# Patient Record
Sex: MALE | Race: WHITE | NOT HISPANIC OR LATINO | Employment: FULL TIME | URBAN - METROPOLITAN AREA
[De-identification: names, ages, dates, MRNs, and addresses within clinical notes are randomized per-mention and may not be internally consistent; named-entity substitution may affect disease eponyms.]

---

## 2017-11-13 ENCOUNTER — ALLIED HEALTH/NURSE VISIT (OUTPATIENT)
Dept: FAMILY MEDICINE | Facility: OTHER | Age: 32
End: 2017-11-13
Attending: FAMILY MEDICINE
Payer: COMMERCIAL

## 2017-11-13 DIAGNOSIS — Z23 NEED FOR PROPHYLACTIC VACCINATION AND INOCULATION AGAINST INFLUENZA: Primary | ICD-10-CM

## 2017-11-13 PROCEDURE — 90686 IIV4 VACC NO PRSV 0.5 ML IM: CPT

## 2017-11-13 PROCEDURE — 90471 IMMUNIZATION ADMIN: CPT

## 2017-11-13 NOTE — PROGRESS NOTES

## 2017-11-13 NOTE — MR AVS SNAPSHOT
After Visit Summary   11/13/2017    Kaleb Junior    MRN: [id number]           Patient Information     Date Of Birth          1985        Visit Information        Provider Department      11/13/2017 4:30 PM [facility] NURSE [facility]        Today's Diagnoses     Need for prophylactic vaccination and inoculation against influenza    -  1       Follow-ups after your visit        Your next 10 appointments already scheduled     Nov 13, 2017  4:30 PM CST   (Arrive by 4:15 PM)   Nurse Only with [facility] NURSE   [facility] ([facility] )    [address]  [facility] [address]   [phone]              Who to contact     If you have questions or need follow up information about today's clinic visit or your schedule please contact [facility] directly at [phone].  Normal or non-critical lab and imaging results will be communicated to you by Mobibeamhart, letter or phone within 4 business days after the clinic has received the results. If you do not hear from us within 7 days, please contact the clinic through Mobibeamhart or phone. If you have a critical or abnormal lab result, we will notify you by phone as soon as possible.  Submit refill requests through AbleSky or call your pharmacy and they will forward the refill request to us. Please allow 3 business days for your refill to be completed.          Additional Information About Your Visit        Mobibeamhart Information     AbleSky gives you secure access to your electronic health record. If you see a primary care provider, you can also send messages to your care team and make appointments. If you have questions, please call your primary care clinic.  If you do not have a primary care provider, please call [phone] and they will assist you.        Care EveryWhere ID     This is your Care EveryWhere ID. This could be used by other organizations to access your [facility]  records  [id number]         Blood Pressure from Last 3 Encounters:   04/22/14 126/85    Weight from Last 3 Encounters:   04/22/14 180 lb (81.6 kg)              We Performed the Following     FLU VAC, SPLIT VIRUS IM > 3 YO (QUADRIVALENT) [10539]     Vaccine Administration, Initial [84340]        Primary Care Provider    Physician No Ref-Primary       NO REF-PRIMARY PHYSICIAN        Equal Access to Services     [facility] : Hadii aad ku hadasho Soomaali, waaxda luqadaha, qaybta kaalmada adeegyada, waxay olivain hayaan adeeg tankmyrandajohn lasergey . So [facility] [phone].    ATENCIÓN: Si habla español, tiene a rock disposición servicios gratuitos de asistencia lingüística. Llame al [phone].    We comply with applicable federal civil rights laws and [place] laws. We do not discriminate on the basis of race, color, national origin, age, disability, sex, sexual orientation, or gender identity.            Thank you!     Thank you for choosing [facility]  for your care. Our goal is always to provide you with excellent care. Hearing back from our patients is one way we can continue to improve our services. Please take a few minutes to complete the written survey that you may receive in the mail after your visit with us. Thank you!             Your Updated Medication List - Protect others around you: Learn how to safely use, store and throw away your medicines at www.disposemymeds.org.      Notice  As of 11/13/2017  3:59 PM    You have not been prescribed any medications.

## 2018-03-08 ENCOUNTER — OFFICE VISIT (OUTPATIENT)
Dept: FAMILY MEDICINE | Facility: OTHER | Age: 33
End: 2018-03-08
Attending: FAMILY MEDICINE
Payer: COMMERCIAL

## 2018-03-08 VITALS
WEIGHT: 203 LBS | RESPIRATION RATE: 16 BRPM | TEMPERATURE: 98.2 F | SYSTOLIC BLOOD PRESSURE: 112 MMHG | BODY MASS INDEX: 26.9 KG/M2 | DIASTOLIC BLOOD PRESSURE: 70 MMHG | HEART RATE: 78 BPM | OXYGEN SATURATION: 98 % | HEIGHT: 73 IN

## 2018-03-08 DIAGNOSIS — Z23 NEED FOR VACCINATION: ICD-10-CM

## 2018-03-08 DIAGNOSIS — Z76.89 ENCOUNTER TO ESTABLISH CARE: Primary | ICD-10-CM

## 2018-03-08 PROCEDURE — 90715 TDAP VACCINE 7 YRS/> IM: CPT | Performed by: FAMILY MEDICINE

## 2018-03-08 PROCEDURE — 90471 IMMUNIZATION ADMIN: CPT | Performed by: FAMILY MEDICINE

## 2018-03-08 PROCEDURE — 99201 ZZC OFFICE/OUTPT VISIT, NEW, LEVEL I: CPT | Mod: 25 | Performed by: FAMILY MEDICINE

## 2018-03-08 ASSESSMENT — ANXIETY QUESTIONNAIRES
6. BECOMING EASILY ANNOYED OR IRRITABLE: NOT AT ALL
4. TROUBLE RELAXING: NOT AT ALL
1. FEELING NERVOUS, ANXIOUS, OR ON EDGE: NOT AT ALL
GAD7 TOTAL SCORE: 0
2. NOT BEING ABLE TO STOP OR CONTROL WORRYING: NOT AT ALL
3. WORRYING TOO MUCH ABOUT DIFFERENT THINGS: NOT AT ALL
5. BEING SO RESTLESS THAT IT IS HARD TO SIT STILL: NOT AT ALL
7. FEELING AFRAID AS IF SOMETHING AWFUL MIGHT HAPPEN: NOT AT ALL
IF YOU CHECKED OFF ANY PROBLEMS ON THIS QUESTIONNAIRE, HOW DIFFICULT HAVE THESE PROBLEMS MADE IT FOR YOU TO DO YOUR WORK, TAKE CARE OF THINGS AT HOME, OR GET ALONG WITH OTHER PEOPLE: NOT DIFFICULT AT ALL

## 2018-03-08 ASSESSMENT — PAIN SCALES - GENERAL: PAINLEVEL: NO PAIN (0)

## 2018-03-08 NOTE — MR AVS SNAPSHOT
"              After Visit Summary   3/8/2018    Kaleb Junior    MRN: [id number]           Patient Information     Date Of Birth          1985        Visit Information        Provider Department      3/8/2018 1:30 PM Tomasa Colin MD [facility]        Today's Diagnoses     Encounter to establish care    -  1    Need for vaccination           Follow-ups after your visit        Follow-up notes from your care team     Return if symptoms worsen or fail to improve.      Who to contact     If you have questions or need follow up information about today's clinic visit or your schedule please contact [facility] directly at [phone].  Normal or non-critical lab and imaging results will be communicated to you by MyChart, letter or phone within 4 business days after the clinic has received the results. If you do not hear from us within 7 days, please contact the clinic through Samfindhart or phone. If you have a critical or abnormal lab result, we will notify you by phone as soon as possible.  Submit refill requests through [employer] or call your pharmacy and they will forward the refill request to us. Please allow 3 business days for your refill to be completed.          Additional Information About Your Visit        MyChart Information     [employer] gives you secure access to your electronic health record. If you see a primary care provider, you can also send messages to your care team and make appointments. If you have questions, please call your primary care clinic.  If you do not have a primary care provider, please call [phone] and they will assist you.        Care EveryWhere ID     This is your Care EveryWhere ID. This could be used by other organizations to access your [place] medical records  [id number]        Your Vitals Were     Pulse Temperature Respirations Height Pulse Oximetry BMI (Body Mass Index)    78 98.2  F (36.8  C) (Tympanic) 16 6' 1\" (1.854 m) 98% 26.78 kg/m2 "       Blood Pressure from Last 3 Encounters:   03/08/18 112/70   04/22/14 126/85    Weight from Last 3 Encounters:   03/08/18 203 lb (92.1 kg)   04/22/14 180 lb (81.6 kg)              We Performed the Following     1st  Administration  [25056]     TDAP VACCINE (ADACEL) [83821.002]        Primary Care Provider Fax #    Physician No Ref-Primary [phone]       No address on file        Equal Access to Services     [facility] : Hadii aad ku hadasho Soomaali, waaxda luqadaha, qaybta kaalmada adeegyada, waxay idiin hayarmandn adecandi fumyrandajohn mcbride . So [facility] [phone].    ATENCIÓN: Si habla español, tiene a rock disposición servicios gratuitos de asistencia lingüística. Llame al [phone].    We comply with applicable federal civil rights laws and [place] laws. We do not discriminate on the basis of race, color, national origin, age, disability, sex, sexual orientation, or gender identity.            Thank you!     Thank you for choosing [facility]  for your care. Our goal is always to provide you with excellent care. Hearing back from our patients is one way we can continue to improve our services. Please take a few minutes to complete the written survey that you may receive in the mail after your visit with us. Thank you!             Your Updated Medication List - Protect others around you: Learn how to safely use, store and throw away your medicines at www.disposemymeds.org.      Notice  As of 3/8/2018 11:59 PM    You have not been prescribed any medications.

## 2018-03-08 NOTE — NURSING NOTE
"Chief Complaint   Patient presents with     Establish Care       Initial /70 (BP Location: Left arm, Patient Position: Chair, Cuff Size: Adult Large)  Pulse 78  Temp 98.2  F (36.8  C) (Tympanic)  Resp 16  Ht 6' 1\" (1.854 m)  Wt 203 lb (92.1 kg)  SpO2 98%  BMI 26.78 kg/m2 Estimated body mass index is 26.78 kg/(m^2) as calculated from the following:    Height as of this encounter: 6' 1\" (1.854 m).    Weight as of this encounter: 203 lb (92.1 kg).  Medication Reconciliation: complete   Pamela M Lechevalier LPN      "

## 2018-03-09 ASSESSMENT — PATIENT HEALTH QUESTIONNAIRE - PHQ9: SUM OF ALL RESPONSES TO PHQ QUESTIONS 1-9: 0

## 2018-03-09 ASSESSMENT — ANXIETY QUESTIONNAIRES: GAD7 TOTAL SCORE: 0

## 2018-03-09 NOTE — PROGRESS NOTES
"  SUBJECTIVE:   Kaleb Junior is a 32 year old male who presents to clinic today for the following health issues:    New Patient/Establish Care    Patient presents today to establish care.  He states he is overall pretty healthy and has no specific concerns or complaints he wishes to discuss today.  He does complete a [employer] exam at work annually, and does have labs completed at that time.  He states he will be having another exam in the fall, and will bring me a copy of the lab results.  In review of his chart, he is due to Tdap.      Problem list and histories reviewed & adjusted, as indicated.  Additional history: as documented    Patient Active Problem List   Diagnosis     NO ACTIVE PROBLEMS     Past Surgical History:   Procedure Laterality Date     HAND SURGERY Right 2006     ORTHOPEDIC SURGERY      tendon repair right hand       Social History   Substance Use Topics     Smoking status: Former Smoker     Smokeless tobacco: Current User     Types: Chew     Alcohol use Yes      Comment: occasionaly      Family History   Problem Relation Age of Onset     DIABETES Paternal Grandmother          No current outpatient prescriptions on file.     No Known Allergies    Reviewed and updated as needed this visit by clinical staff  Tobacco  Allergies  Meds  Problems  Soc Hx      Reviewed and updated as needed this visit by Provider         ROS:  Constitutional, HEENT, cardiovascular, pulmonary, gi and gu systems are negative, except as otherwise noted.    OBJECTIVE:     /70 (BP Location: Left arm, Patient Position: Chair, Cuff Size: Adult Large)  Pulse 78  Temp 98.2  F (36.8  C) (Tympanic)  Resp 16  Ht 6' 1\" (1.854 m)  Wt 203 lb (92.1 kg)  SpO2 98%  BMI 26.78 kg/m2  Body mass index is 26.78 kg/(m^2).  GENERAL: healthy, alert and no distress  RESP: lungs clear to auscultation - no rales, rhonchi or wheezes  CV: regular rate and rhythm, normal S1 S2, no S3 or S4, no murmur, click or rub, no " peripheral edema and peripheral pulses strong  PSYCH: mentation appears normal, affect normal/bright    Diagnostic Test Results:  none     ASSESSMENT/PLAN:       ICD-10-CM    1. Encounter to establish care Z76.89    2. Need for vaccination Z23 TDAP VACCINE (ADACEL) [89075.002]     1st  Administration  [33016]       FUTURE APPOINTMENTS:       - Follow-up for annual visit or as needed    Tomasa Colin MD  [facility]

## 2018-10-29 ENCOUNTER — ALLIED HEALTH/NURSE VISIT (OUTPATIENT)
Dept: FAMILY MEDICINE | Facility: OTHER | Age: 33
End: 2018-10-29
Attending: FAMILY MEDICINE
Payer: COMMERCIAL

## 2018-10-29 DIAGNOSIS — Z23 NEED FOR PROPHYLACTIC VACCINATION AND INOCULATION AGAINST INFLUENZA: Primary | ICD-10-CM

## 2018-10-29 PROCEDURE — 90686 IIV4 VACC NO PRSV 0.5 ML IM: CPT

## 2018-10-29 PROCEDURE — 90471 IMMUNIZATION ADMIN: CPT

## 2018-10-29 NOTE — MR AVS SNAPSHOT
After Visit Summary   10/29/2018    Kaleb Junior    MRN: [id number]           Patient Information     Date Of Birth          1985        Visit Information        Provider Department      10/29/2018 1:45 PM [facility] NURSE [facility]        Today's Diagnoses     Need for prophylactic vaccination and inoculation against influenza    -  1       Follow-ups after your visit        Your next 10 appointments already scheduled     Oct 29, 2018  1:45 PM CDT   (Arrive by 1:30 PM)   Nurse Only with [facility] NURSE   [facility] ([facility] )    [address]  [facility] [address]   [phone]              Who to contact     If you have questions or need follow up information about today's clinic visit or your schedule please contact [facility] directly at [phone].  Normal or non-critical lab and imaging results will be communicated to you by MyChart, letter or phone within 4 business days after the clinic has received the results. If you do not hear from us within 7 days, please contact the clinic through makerSQRhart or phone. If you have a critical or abnormal lab result, we will notify you by phone as soon as possible.  Submit refill requests through Glimpse or call your pharmacy and they will forward the refill request to us. Please allow 3 business days for your refill to be completed.          Additional Information About Your Visit        MyChart Information     Glimpse gives you secure access to your electronic health record. If you see a primary care provider, you can also send messages to your care team and make appointments. If you have questions, please call your primary care clinic.  If you do not have a primary care provider, please call [phone] and they will assist you.        Care EveryWhere ID     This is your Care EveryWhere ID. This could be used by other organizations to access your  [place] medical records  [id number]         Blood Pressure from Last 3 Encounters:   03/08/18 112/70   04/22/14 126/85    Weight from Last 3 Encounters:   03/08/18 203 lb (92.1 kg)   04/22/14 180 lb (81.6 kg)              We Performed the Following     HC FLU VAC PRESRV FREE QUAD SPLIT VIR 3+YRS IM     Vaccine Administration, Initial [85627]        Primary Care Provider Office Phone # Fax #    Tomasa ARTUR Colin [phone] [address] [facility] [address]        Equal Access to Services     [facility]: Hadii roderick moreno Sooumar, waaxda luqadaha, qaybta kaalmaverónica gregorio, valentin mcbride . So [facility] [phone].    ATENCIÓN: Si habla español, tiene a rock disposición servicios gratuitos de asistencia lingüística. LlThe [facility] [phone].    We comply with applicable federal civil rights laws and [place] laws. We do not discriminate on the basis of race, color, national origin, age, disability, sex, sexual orientation, or gender identity.            Thank you!     Thank you for choosing [facility]  for your care. Our goal is always to provide you with excellent care. Hearing back from our patients is one way we can continue to improve our services. Please take a few minutes to complete the written survey that you may receive in the mail after your visit with us. Thank you!             Your Updated Medication List - Protect others around you: Learn how to safely use, store and throw away your medicines at www.disposemymeds.org.      Notice  As of 10/29/2018 11:17 AM    You have not been prescribed any medications.

## 2018-10-29 NOTE — PROGRESS NOTES

## 2018-11-05 ENCOUNTER — TRANSFERRED RECORDS (OUTPATIENT)
Dept: HEALTH INFORMATION MANAGEMENT | Facility: CLINIC | Age: 33
End: 2018-11-05

## 2018-11-05 LAB
ALT SERPL-CCNC: 21 U/L (ref 9–46)
AST SERPL-CCNC: 16 U/L (ref 10–40)
CHOLEST SERPL-MCNC: 200 MG/DL
CREAT SERPL-MCNC: 0.91 MG/DL (ref 0.6–1.35)
GFR SERPL CREATININE-BSD FRML MDRD: 110 ML/MIN/1.73M2
GLUCOSE SERPL-MCNC: 90 MG/DL (ref 65–99)
HDLC SERPL-MCNC: 61 MG/DL
LDLC SERPL CALC-MCNC: 117 MG/DL
NONHDLC SERPL-MCNC: 139 MG/DL
POTASSIUM SERPL-SCNC: 4.5 MMOL/L (ref 3.5–5.3)
TRIGL SERPL-MCNC: 108 MG/DL

## 2019-11-25 ENCOUNTER — ALLIED HEALTH/NURSE VISIT (OUTPATIENT)
Dept: FAMILY MEDICINE | Facility: OTHER | Age: 34
End: 2019-11-25
Attending: FAMILY MEDICINE
Payer: COMMERCIAL

## 2019-11-25 DIAGNOSIS — Z23 NEED FOR PROPHYLACTIC VACCINATION AND INOCULATION AGAINST INFLUENZA: Primary | ICD-10-CM

## 2019-11-25 PROCEDURE — 90471 IMMUNIZATION ADMIN: CPT

## 2019-11-25 PROCEDURE — 90686 IIV4 VACC NO PRSV 0.5 ML IM: CPT

## 2020-03-02 ENCOUNTER — HEALTH MAINTENANCE LETTER (OUTPATIENT)
Age: 35
End: 2020-03-02

## 2020-07-14 ENCOUNTER — VIRTUAL VISIT (OUTPATIENT)
Dept: FAMILY MEDICINE | Facility: OTHER | Age: 35
End: 2020-07-14

## 2020-07-14 ENCOUNTER — OFFICE VISIT (OUTPATIENT)
Dept: FAMILY MEDICINE | Facility: OTHER | Age: 35
End: 2020-07-14
Attending: FAMILY MEDICINE
Payer: COMMERCIAL

## 2020-07-14 VITALS
HEART RATE: 97 BPM | OXYGEN SATURATION: 98 % | WEIGHT: 214 LBS | BODY MASS INDEX: 28.36 KG/M2 | SYSTOLIC BLOOD PRESSURE: 126 MMHG | HEIGHT: 73 IN | RESPIRATION RATE: 16 BRPM | DIASTOLIC BLOOD PRESSURE: 76 MMHG | TEMPERATURE: 97.8 F

## 2020-07-14 DIAGNOSIS — R50.9 FEVER, UNSPECIFIED FEVER CAUSE: ICD-10-CM

## 2020-07-14 DIAGNOSIS — R05.9 COUGH: Primary | ICD-10-CM

## 2020-07-14 PROCEDURE — U0003 INFECTIOUS AGENT DETECTION BY NUCLEIC ACID (DNA OR RNA); SEVERE ACUTE RESPIRATORY SYNDROME CORONAVIRUS 2 (SARS-COV-2) (CORONAVIRUS DISEASE [COVID-19]), AMPLIFIED PROBE TECHNIQUE, MAKING USE OF HIGH THROUGHPUT TECHNOLOGIES AS DESCRIBED BY CMS-2020-01-R: HCPCS | Performed by: FAMILY MEDICINE

## 2020-07-14 PROCEDURE — 99213 OFFICE O/P EST LOW 20 MIN: CPT | Performed by: FAMILY MEDICINE

## 2020-07-14 ASSESSMENT — ANXIETY QUESTIONNAIRES
4. TROUBLE RELAXING: NOT AT ALL
3. WORRYING TOO MUCH ABOUT DIFFERENT THINGS: NOT AT ALL
IF YOU CHECKED OFF ANY PROBLEMS ON THIS QUESTIONNAIRE, HOW DIFFICULT HAVE THESE PROBLEMS MADE IT FOR YOU TO DO YOUR WORK, TAKE CARE OF THINGS AT HOME, OR GET ALONG WITH OTHER PEOPLE: NOT DIFFICULT AT ALL
1. FEELING NERVOUS, ANXIOUS, OR ON EDGE: NOT AT ALL
6. BECOMING EASILY ANNOYED OR IRRITABLE: NOT AT ALL
2. NOT BEING ABLE TO STOP OR CONTROL WORRYING: NOT AT ALL
GAD7 TOTAL SCORE: 0
7. FEELING AFRAID AS IF SOMETHING AWFUL MIGHT HAPPEN: NOT AT ALL
5. BEING SO RESTLESS THAT IT IS HARD TO SIT STILL: NOT AT ALL

## 2020-07-14 ASSESSMENT — MIFFLIN-ST. JEOR: SCORE: 1954.58

## 2020-07-14 ASSESSMENT — PATIENT HEALTH QUESTIONNAIRE - PHQ9: SUM OF ALL RESPONSES TO PHQ QUESTIONS 1-9: 0

## 2020-07-14 NOTE — PROGRESS NOTES
"Subjective     Kaleb Junior is a 35 year old male who presents to clinic today for the following health issues:    HPI   RESPIRATORY SYMPTOMS      Duration: 5 days    Description  nasal congestion, sore throat, cough, fever, headache, fatigue/malaise and hoarse voice    Severity: mild    Accompanying signs and symptoms: None    History (predisposing factors):  none    Precipitating or alleviating factors: None    Therapies tried and outcome:  rest and fluids acetaminophen    Patient did start an encounter with [facility], testing was recommended.    Patient Active Problem List   Diagnosis     NO ACTIVE PROBLEMS     Past Surgical History:   Procedure Laterality Date     HAND SURGERY Right 2006     ORTHOPEDIC SURGERY      tendon repair right hand       Social History     Tobacco Use     Smoking status: Former Smoker     Smokeless tobacco: Current User     Types: Chew   Substance Use Topics     Alcohol use: Yes     Comment: occasionaly      Family History   Problem Relation Age of Onset     Diabetes Paternal Grandmother          No current outpatient medications on file.     No Known Allergies      Reviewed and updated as needed this visit by Provider  Tobacco  Allergies  Meds  Problems  Med Hx  Surg Hx  Fam Hx         Review of Systems   Constitutional, HEENT, cardiovascular, pulmonary, gi and gu systems are negative, except as otherwise noted.      Objective    /76 (BP Location: Right arm, Patient Position: Sitting, Cuff Size: Adult Regular)   Pulse 97   Temp 97.8  F (36.6  C) (Tympanic)   Resp 16   Ht 1.854 m (6' 1\")   Wt 97.1 kg (214 lb)   SpO2 98%   BMI 28.23 kg/m    Body mass index is 28.23 kg/m .  Physical Exam   GENERAL: healthy, alert and no distress  EYES: Eyes grossly normal to inspection, PERRL and conjunctivae and sclerae normal  HENT: ear canals and TM's normal, nose and mouth without ulcers or lesions  NECK: no adenopathy  RESP: lungs clear to auscultation - no rales, rhonchi or " "wheezes  CV: regular rates and rhythm, normal S1 S2, no S3 or S4 and no murmur, click or rub  PSYCH: mentation appears normal, affect normal/bright    Diagnostic Test Results:  See orders        Assessment & Plan     1. Cough  Testing ordered, symptoms are likely allergy vs viral vs other.  Will notify patient of results when available.  - Symptomatic COVID-19 Virus (Coronavirus) by PCR    2. Fever, unspecified fever cause  - Symptomatic COVID-19 Virus (Coronavirus) by PCR     BMI:   Estimated body mass index is 28.23 kg/m  as calculated from the following:    Height as of this encounter: 1.854 m (6' 1\").    Weight as of this encounter: 97.1 kg (214 lb).         Return if symptoms worsen or fail to improve.    Tomasa Colin MD  [facility]      "

## 2020-07-14 NOTE — PROGRESS NOTES
"Date: 2020 06:56:10  Clinician: Tavo Rosenberg  Clinician NPI: [id number]  Patient: Kaleb Junior  Patient : 1985  Patient Address: [place] [address]  Patient Phone: [phone]  Visit Protocol: URI  Patient Summary:  Kaleb is a 35 year old ( : 1985 ) male who initiated a Visit for cold, sinus infection, or influenza. When asked the question \"Please sign me up to receive news, health information and promotions from [employer].\", Kaleb responded \"No\".    Kaleb states his symptoms started gradually 3-4 days ago.   His symptoms consist of rhinitis, malaise, a headache, a cough, and nasal congestion. Kaleb also feels feverish.   Symptom details     Nasal secretions: The color of his mucus is white, yellow, and clear.    Cough: Kaleb coughs a few times an hour and his cough is not more bothersome at night. Phlegm comes into his throat when he coughs. He believes his cough is caused by post-nasal drip. The color of the phlegm is yellow.     Temperature: His current temperature is 99.5 degrees Fahrenheit.     Headache: He states the headache is mild (1-3 on a 10 point pain scale).      Kaleb denies having wheezing, nausea, teeth pain, ageusia, diarrhea, vomiting, ear pain, chills, sore throat, myalgias, anosmia, and facial pain or pressure. He also denies having recent facial or sinus surgery in the past 60 days, taking antibiotic medication in the past month, having a sinus infection within the past year, and double sickening (worsening symptoms after initial improvement). He is not experiencing dyspnea.   Precipitating events  He has not recently been exposed to someone with influenza. Kaleb has been in close contact with the following high risk individuals: children under the age of 5, immunocompromised people, and adults 65 or older.   Pertinent COVID-19 (Coronavirus) information  In the past 14 days, Kaleb has not worked in a congregate living setting.   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 He does not provide direct patient care. Additional job details as reported by the patient (free text):  working for the ambulance service as a . Also a  for [employer]   Kaleb also has not lived in a congregate living setting in the past 14 days. He does not live with a healthcare worker.   Kaleb has not had a close contact with a laboratory-confirmed COVID-19 patient within 14 days of symptom onset.   Pertinent medical history  Kaleb does not need a return to work/school note.   Weight: 220 lbs   Kaleb does not smoke or use smokeless tobacco.   Weight: 220 lbs    MEDICATIONS: No current medications, ALLERGIES: NKDA  Clinician Response:  Dear Kaleb,    Alvarado reese, Kaleb,  You do need to be tested and need to self-quarantine until results are back. Please see the contact information below to set up a testing appointment. WILMA Rosenberg MD     Your symptoms show that you may have coronavirus (COVID-19). This illness can cause fever, cough and trouble breathing. Many people get a mild case and get better on their own. Some people can get very sick.  What should I do?  We would like to test you for this virus.   1. Please call [phone] to schedule your visit. Explain that you were referred by [facility] to have a COVID-19 test. Be ready to share your [facility] visit ID number.  The following will serve as your written order for this COVID Test, ordered by me, for the indication of suspected COVID [Z20.828]: The test will be ordered in [employer], our electronic health record, after you are scheduled. It will show as ordered and authorized by Cristian Hinojosa MD.  Order: COVID-19 (Coronavirus) PCR for SYMPTOMATIC testing from [facility].      2. When it's time for your COVID test:  Stay at least 6 feet away from others. (If someone will drive you to your test, stay in the backseat, as far away from  "the  as you can.)   Cover your mouth and nose with a mask, tissue or washcloth.  Go straight to the testing site. Don't make any stops on the way there or back.      3.Starting now: Stay home and away from others (self-isolate) until:   You've had no fever---and no medicine that reduces fever---for 3 full days (72 hours). And...   Your other symptoms have gotten better. For example, your cough or breathing has improved. And...   At least 10 days have passed since your symptoms started.       During this time, don't leave the house except for testing or medical care.   Stay in your own room, even for meals. Use your own bathroom if you can.   Stay away from others in your home. No hugging, kissing or shaking hands. No visitors.  Don't go to work, school or anywhere else.    Clean \"high touch\" surfaces often (doorknobs, counters, handles, etc.). Use a household cleaning spray or wipes. You'll find a full list of  on the EPA website: www.epa.gov/pesticide-registration/list-n-disinfectants-use-against-sars-cov-2.   Cover your mouth and nose with a mask, tissue or washcloth to avoid spreading germs.  Wash your hands and face often. Use soap and water.  Caregivers in these groups are at risk for severe illness due to COVID-19:  o People 65 years and older  o People who live in a nursing home or long-term care facility  o People with chronic disease (lung, heart, cancer, diabetes, kidney, liver, immunologic)  o People who have a weakened immune system, including those who:   Are in cancer treatment  Take medicine that weakens the immune system, such as corticosteroids  Had a bone marrow or organ transplant  Have an immune deficiency  Have poorly controlled HIV or AIDS  Are obese (body mass index of 40 or higher)  Smoke regularly   o Caregivers should wear gloves while washing dishes, handling laundry and cleaning bedrooms and bathrooms.  o Use caution when washing and drying laundry: Don't shake dirty " laundry, and use the warmest water setting that you can.  o For more tips, go to www.cdc.gov/coronavirus/2019-ncov/downloads/10Things.pdf.    4.Sign up for [email or web address]. We know it's scary to hear that you might have COVID-19. We want to track your symptoms to make sure you're okay over the next 2 weeks. Please look for an email from [employer]---this is a free, online program that we'll use to keep in touch. To sign up, follow the link in the email. Learn more at [email or web address]  How can I take care of myself?   Get lots of rest. Drink extra fluids (unless a doctor has told you not to).   Take Tylenol (acetaminophen) for fever or pain. If you have liver or kidney problems, ask your family doctor if it's okay to take Tylenol.   Adults can take either:    650 mg (two 325 mg pills) every 4 to 6 hours, or...   1,000 mg (two 500 mg pills) every 8 hours as needed.    Note: Don't take more than 3,000 mg in one day. Acetaminophen is found in many medicines (both prescribed and over-the-counter medicines). Read all labels to be sure you don't take too much.   For children, check the Tylenol bottle for the right dose. The dose is based on the child's age or weight.    If you have other health problems (like cancer, heart failure, an organ transplant or severe kidney disease): Call your specialty clinic if you don't feel better in the next 2 days.       Know when to call 911. Emergency warning signs include:    Trouble breathing or shortness of breath Pain or pressure in the chest that doesn't go away Feeling confused like you haven't felt before, or not being able to wake up Bluish-colored lips or face.  Where can I get more information?    [employer] -- About COVID-19: [email or web address]   CDC -- What to Do If You're Sick: www.cdc.gov/coronavirus/2019-ncov/about/steps-when-sick.html   CDC -- Ending Home Isolation: www.cdc.gov/coronavirus/2019-ncov/hcp/disposition-in-home-patients.html    CDC -- Caring for Someone: www.cdc.gov/coronavirus/2019-ncov/if-you-are-sick/care-for-someone.html   [facility] -- Interim Guidance for Hospital Discharge to Home: www.health.[facility].[place]/diseases/coronavirus/hcp/hospdischarge.pdf   [facility] clinical trials (COVID-19 research studies): clinicalaffairs.[facility]-clinical-trials    Below are the COVID-19 hotlines at the [place] Department of Health ([facility]). Interpreters are available.    For health questions: Call 494-557-0129 or 1-287.966.1847 (7 a.m. to 7 p.m.) For questions about schools and childcare: Call 947-445-0307 or 1-804.556.5040 (7 a.m. to 7 p.m.)         Diagnosis: Cough  Diagnosis ICD: R05

## 2020-07-14 NOTE — NURSING NOTE
"Chief Complaint   Patient presents with     URI       Initial /76 (BP Location: Right arm, Patient Position: Sitting, Cuff Size: Adult Regular)   Pulse 97   Temp 97.8  F (36.6  C) (Tympanic)   Resp 16   Ht 1.854 m (6' 1\")   Wt 97.1 kg (214 lb)   SpO2 98%   BMI 28.23 kg/m   Estimated body mass index is 28.23 kg/m  as calculated from the following:    Height as of this encounter: 1.854 m (6' 1\").    Weight as of this encounter: 97.1 kg (214 lb).  Medication Reconciliation: complete  Heaven Meracdo MA  "

## 2020-07-15 ASSESSMENT — ANXIETY QUESTIONNAIRES: GAD7 TOTAL SCORE: 0

## 2020-07-16 LAB
SARS-COV-2 RNA SPEC QL NAA+PROBE: NOT DETECTED
SPECIMEN SOURCE: NORMAL

## 2020-07-23 ENCOUNTER — MYC MEDICAL ADVICE (OUTPATIENT)
Dept: FAMILY MEDICINE | Facility: OTHER | Age: 35
End: 2020-07-23

## 2020-07-23 DIAGNOSIS — L29.3 PERINEAL IRRITATION: Primary | ICD-10-CM

## 2020-07-24 ENCOUNTER — HOSPITAL ENCOUNTER (EMERGENCY)
Facility: HOSPITAL | Age: 35
Discharge: HOME | End: 2020-07-24
Attending: PHYSICIAN ASSISTANT | Admitting: PHYSICIAN ASSISTANT
Payer: COMMERCIAL

## 2020-07-24 VITALS
SYSTOLIC BLOOD PRESSURE: 147 MMHG | DIASTOLIC BLOOD PRESSURE: 95 MMHG | TEMPERATURE: 99.1 F | HEART RATE: 103 BPM | OXYGEN SATURATION: 99 % | RESPIRATION RATE: 16 BRPM

## 2020-07-24 DIAGNOSIS — K62.89 RECTAL PAIN: ICD-10-CM

## 2020-07-24 PROCEDURE — 99282 EMERGENCY DEPT VISIT SF MDM: CPT | Mod: Z6 | Performed by: PHYSICIAN ASSISTANT

## 2020-07-24 PROCEDURE — 99282 EMERGENCY DEPT VISIT SF MDM: CPT

## 2020-07-24 RX ORDER — TRIAMCINOLONE ACETONIDE 1 MG/G
CREAM TOPICAL 2 TIMES DAILY
Qty: 80 G | Refills: 1 | Status: SHIPPED | OUTPATIENT
Start: 2020-07-24 | End: 2024-03-18

## 2020-07-24 ASSESSMENT — ENCOUNTER SYMPTOMS
RECTAL PAIN: 1
CONSTITUTIONAL NEGATIVE: 1
BLOOD IN STOOL: 0
ABDOMINAL PAIN: 0

## 2020-07-24 NOTE — TELEPHONE ENCOUNTER
Any chance that we can get him in to general surgery soon?  Could you call them please?  I sent you something yesterday and did not hear back from you.

## 2020-07-24 NOTE — TELEPHONE ENCOUNTER
I sent in triamcinolone cream.  Is he going to see general surgery?  If that painful, perhaps urgent care today for an exam.

## 2020-07-24 NOTE — ED AVS SNAPSHOT
[facility]  [address] [place] [address]  Phone:  [phone]                                    Kaleb Junior   MRN: [id number]    Department:  [facility]   Date of Visit:  7/24/2020           After Visit Summary Signature Page    I have received my discharge instructions, and my questions have been answered. I have discussed any challenges I see with this plan with the nurse or doctor.    ..........................................................................................................................................  Patient/Patient Representative Signature      ..........................................................................................................................................  Patient Representative Print Name and Relationship to Patient    ..................................................               ................................................  Date                                   Time    ..........................................................................................................................................  Reviewed by Signature/Title    ...................................................              ..............................................  Date                                               Time          22EPIC Rev 08/18

## 2020-07-25 NOTE — ED NOTES
"c/o \"butthole pain\" since Sunday. States pain in around the anus and \"kind of up inside\" the rectum.   States was prescribed a cream today by Dr. Macias and tried it tonight but it made the pain worse, \"burning so bad\"  Denies constipation. Denies history of hemorrhoids \"that I know of.\" Rates pain 6/10  No colonoscopy or GI HX.  "

## 2020-07-25 NOTE — ED PROVIDER NOTES
"  History     Chief Complaint   Patient presents with     Rectal/perineal Pain     c/o \"butthole pain\" since Sunday. States was prescribed a cream recently and tried it tonight but it made the pain worse. Denies constipation. Denies history of hemorrhoids \"that I know of.\" Rates pain 6/10.      HPI  Kaleb Junior is a 35 year old male who presents to ED with complaints of rectal pain since Sunday.  He tried rectal suppository with minimal improvement in pain.  PCP sent prescription for topical steroid cream today which did not improve symptoms.  He has noted narrow stools the last few days.  Denies constipation.  Pain did improve with ibuprofen.      Allergies:  No Known Allergies    Problem List:    Patient Active Problem List    Diagnosis Date Noted     NO ACTIVE PROBLEMS 03/09/2018     Priority: Medium        Past Medical History:    No past medical history on file.    Past Surgical History:    Past Surgical History:   Procedure Laterality Date     HAND SURGERY Right 2006     ORTHOPEDIC SURGERY      tendon repair right hand       Family History:    Family History   Problem Relation Age of Onset     Diabetes Paternal Grandmother        Social History:  Marital Status:  Single [1]  Social History     Tobacco Use     Smoking status: Former Smoker     Smokeless tobacco: Current User     Types: Chew   Substance Use Topics     Alcohol use: Yes     Comment: occasionaly      Drug use: No        Medications:    triamcinolone (KENALOG) 0.1 % external cream          Review of Systems   Constitutional: Negative.    Gastrointestinal: Positive for rectal pain. Negative for abdominal pain and blood in stool.   Genitourinary: Negative.        Physical Exam   BP: 147/95  Pulse: 103  Temp: 99.1  F (37.3  C)  Resp: 18  SpO2: 99 %      Physical Exam  Constitutional:       General: He is not in acute distress.     Appearance: He is well-developed. He is not diaphoretic.   HENT:      Head: Normocephalic and atraumatic.   Eyes:      " General: No scleral icterus.  Neck:      Musculoskeletal: Normal range of motion and neck supple.   Genitourinary:     Rectum: Normal.      Comments: No palpable hemorrhoid, fissure.  No pain to palpation.  Skin:     General: Skin is warm and dry.      Findings: No rash.   Neurological:      Mental Status: He is alert and oriented to person, place, and time.         ED Course        Procedures  Referral to surgery provided.  Return if worsening symptoms.  Continue to use OTC suppositories.  May take up to 800 mg ibuprofen every 8 hours as needed for pain taken with food or milk.    May take two extra strength Tylenol (acetaminophen 500 mg) every 8 hours as needed for pain.                    No results found for this or any previous visit (from the past 24 hour(s)).    Medications - No data to display    Assessments & Plan (with Medical Decision Making)     I have reviewed the nursing notes.    I have reviewed the findings, diagnosis, plan and need for follow up with the patient.    New Prescriptions    No medications on file       Final diagnoses:   Rectal pain       7/24/2020   [facility]     Yong Kirby PA  07/24/20 8214

## 2020-07-27 ENCOUNTER — ANESTHESIA (OUTPATIENT)
Dept: SURGERY | Facility: HOSPITAL | Age: 35
End: 2020-07-27
Payer: COMMERCIAL

## 2020-07-27 ENCOUNTER — TELEPHONE (OUTPATIENT)
Dept: FAMILY MEDICINE | Facility: OTHER | Age: 35
End: 2020-07-27

## 2020-07-27 ENCOUNTER — MYC MEDICAL ADVICE (OUTPATIENT)
Dept: FAMILY MEDICINE | Facility: OTHER | Age: 35
End: 2020-07-27

## 2020-07-27 ENCOUNTER — APPOINTMENT (OUTPATIENT)
Dept: CT IMAGING | Facility: HOSPITAL | Age: 35
End: 2020-07-27
Attending: NURSE PRACTITIONER
Payer: COMMERCIAL

## 2020-07-27 ENCOUNTER — HOSPITAL ENCOUNTER (EMERGENCY)
Facility: HOSPITAL | Age: 35
Discharge: HOME | End: 2020-07-27
Attending: NURSE PRACTITIONER | Admitting: NURSE PRACTITIONER
Payer: COMMERCIAL

## 2020-07-27 ENCOUNTER — ANESTHESIA EVENT (OUTPATIENT)
Dept: SURGERY | Facility: HOSPITAL | Age: 35
End: 2020-07-27
Payer: COMMERCIAL

## 2020-07-27 ENCOUNTER — APPOINTMENT (OUTPATIENT)
Dept: GENERAL RADIOLOGY | Facility: HOSPITAL | Age: 35
End: 2020-07-27
Attending: NURSE PRACTITIONER
Payer: COMMERCIAL

## 2020-07-27 VITALS
DIASTOLIC BLOOD PRESSURE: 82 MMHG | SYSTOLIC BLOOD PRESSURE: 129 MMHG | HEART RATE: 97 BPM | RESPIRATION RATE: 20 BRPM | TEMPERATURE: 97 F | OXYGEN SATURATION: 99 %

## 2020-07-27 DIAGNOSIS — K60.30 PERIANAL FISTULA: Primary | ICD-10-CM

## 2020-07-27 DIAGNOSIS — K61.1 PERIRECTAL ABSCESS: ICD-10-CM

## 2020-07-27 LAB
ALBUMIN SERPL-MCNC: 3.8 G/DL (ref 3.4–5)
ALBUMIN UR-MCNC: NEGATIVE MG/DL
ALP SERPL-CCNC: 94 U/L (ref 40–150)
ALT SERPL W P-5'-P-CCNC: 26 U/L (ref 0–70)
ANION GAP SERPL CALCULATED.3IONS-SCNC: 5 MMOL/L (ref 3–14)
APPEARANCE UR: CLEAR
AST SERPL W P-5'-P-CCNC: 12 U/L (ref 0–45)
BACTERIA #/AREA URNS HPF: ABNORMAL /HPF
BASOPHILS # BLD AUTO: 0 10E9/L (ref 0–0.2)
BASOPHILS NFR BLD AUTO: 0.3 %
BILIRUB SERPL-MCNC: 0.6 MG/DL (ref 0.2–1.3)
BILIRUB UR QL STRIP: NEGATIVE
BUN SERPL-MCNC: 11 MG/DL (ref 7–30)
CALCIUM SERPL-MCNC: 9.5 MG/DL (ref 8.5–10.1)
CHLORIDE SERPL-SCNC: 107 MMOL/L (ref 94–109)
CO2 SERPL-SCNC: 28 MMOL/L (ref 20–32)
COLOR UR AUTO: ABNORMAL
CREAT SERPL-MCNC: 0.79 MG/DL (ref 0.66–1.25)
CRP SERPL-MCNC: 78.6 MG/L (ref 0–8)
DIFFERENTIAL METHOD BLD: ABNORMAL
EOSINOPHIL # BLD AUTO: 0.2 10E9/L (ref 0–0.7)
EOSINOPHIL NFR BLD AUTO: 1.3 %
ERYTHROCYTE [DISTWIDTH] IN BLOOD BY AUTOMATED COUNT: 11.9 % (ref 10–15)
ERYTHROCYTE [SEDIMENTATION RATE] IN BLOOD BY WESTERGREN METHOD: 21 MM/H (ref 0–15)
GFR SERPL CREATININE-BSD FRML MDRD: >90 ML/MIN/{1.73_M2}
GLUCOSE SERPL-MCNC: 100 MG/DL (ref 70–99)
GLUCOSE UR STRIP-MCNC: NEGATIVE MG/DL
HCT VFR BLD AUTO: 42.5 % (ref 40–53)
HGB BLD-MCNC: 14.2 G/DL (ref 13.3–17.7)
HGB UR QL STRIP: ABNORMAL
IMM GRANULOCYTES # BLD: 0.1 10E9/L (ref 0–0.4)
IMM GRANULOCYTES NFR BLD: 0.7 %
KETONES UR STRIP-MCNC: NEGATIVE MG/DL
LEUKOCYTE ESTERASE UR QL STRIP: NEGATIVE
LYMPHOCYTES # BLD AUTO: 1.1 10E9/L (ref 0.8–5.3)
LYMPHOCYTES NFR BLD AUTO: 8.2 %
MCH RBC QN AUTO: 29.5 PG (ref 26.5–33)
MCHC RBC AUTO-ENTMCNC: 33.4 G/DL (ref 31.5–36.5)
MCV RBC AUTO: 88 FL (ref 78–100)
MONOCYTES # BLD AUTO: 1.3 10E9/L (ref 0–1.3)
MONOCYTES NFR BLD AUTO: 9.9 %
MUCOUS THREADS #/AREA URNS LPF: PRESENT /LPF
NEUTROPHILS # BLD AUTO: 10.7 10E9/L (ref 1.6–8.3)
NEUTROPHILS NFR BLD AUTO: 79.6 %
NITRATE UR QL: NEGATIVE
NRBC # BLD AUTO: 0 10*3/UL
NRBC BLD AUTO-RTO: 0 /100
PH UR STRIP: 5.5 PH (ref 4.7–8)
PLATELET # BLD AUTO: 282 10E9/L (ref 150–450)
POTASSIUM SERPL-SCNC: 4.5 MMOL/L (ref 3.4–5.3)
PROT SERPL-MCNC: 8 G/DL (ref 6.8–8.8)
RBC # BLD AUTO: 4.82 10E12/L (ref 4.4–5.9)
RBC #/AREA URNS AUTO: 1 /HPF (ref 0–2)
SODIUM SERPL-SCNC: 140 MMOL/L (ref 133–144)
SOURCE: ABNORMAL
SP GR UR STRIP: 1.01 (ref 1–1.03)
UROBILINOGEN UR STRIP-MCNC: NORMAL MG/DL (ref 0–2)
WBC # BLD AUTO: 13.5 10E9/L (ref 4–11)
WBC #/AREA URNS AUTO: <1 /HPF (ref 0–5)

## 2020-07-27 PROCEDURE — 36415 COLL VENOUS BLD VENIPUNCTURE: CPT | Performed by: NURSE PRACTITIONER

## 2020-07-27 PROCEDURE — 72193 CT PELVIS W/DYE: CPT | Mod: TC

## 2020-07-27 PROCEDURE — 27110028 ZZH OR GENERAL SUPPLY NON-STERILE: Performed by: SURGERY

## 2020-07-27 PROCEDURE — 86140 C-REACTIVE PROTEIN: CPT | Performed by: NURSE PRACTITIONER

## 2020-07-27 PROCEDURE — 37000008 ZZH ANESTHESIA TECHNICAL FEE, 1ST 30 MIN: Performed by: SURGERY

## 2020-07-27 PROCEDURE — 71000014 ZZH RECOVERY PHASE 1 LEVEL 2 FIRST HR: Performed by: SURGERY

## 2020-07-27 PROCEDURE — 37000009 ZZH ANESTHESIA TECHNICAL FEE, EACH ADDTL 15 MIN: Performed by: SURGERY

## 2020-07-27 PROCEDURE — 74019 RADEX ABDOMEN 2 VIEWS: CPT | Mod: TC

## 2020-07-27 PROCEDURE — 46020 PLACEMENT OF SETON: CPT | Performed by: SURGERY

## 2020-07-27 PROCEDURE — 27210794 ZZH OR GENERAL SUPPLY STERILE: Performed by: SURGERY

## 2020-07-27 PROCEDURE — 99214 OFFICE O/P EST MOD 30 MIN: CPT | Mod: Z6 | Performed by: NURSE PRACTITIONER

## 2020-07-27 PROCEDURE — 25000128 H RX IP 250 OP 636: Performed by: NURSE PRACTITIONER

## 2020-07-27 PROCEDURE — 25000125 ZZHC RX 250

## 2020-07-27 PROCEDURE — 25000128 H RX IP 250 OP 636: Performed by: NURSE ANESTHETIST, CERTIFIED REGISTERED

## 2020-07-27 PROCEDURE — 25000132 ZZH RX MED GY IP 250 OP 250 PS 637: Performed by: SURGERY

## 2020-07-27 PROCEDURE — 36000058 ZZH SURGERY LEVEL 3 EA 15 ADDTL MIN: Performed by: SURGERY

## 2020-07-27 PROCEDURE — 25800030 ZZH RX IP 258 OP 636: Performed by: NURSE ANESTHETIST, CERTIFIED REGISTERED

## 2020-07-27 PROCEDURE — 71000015 ZZH RECOVERY PHASE 1 LEVEL 2 EA ADDTL HR: Performed by: SURGERY

## 2020-07-27 PROCEDURE — 96374 THER/PROPH/DIAG INJ IV PUSH: CPT | Mod: 59

## 2020-07-27 PROCEDURE — 25000128 H RX IP 250 OP 636: Performed by: SURGERY

## 2020-07-27 PROCEDURE — 80053 COMPREHEN METABOLIC PANEL: CPT | Performed by: NURSE PRACTITIONER

## 2020-07-27 PROCEDURE — C9290 INJ, BUPIVACAINE LIPOSOME: HCPCS | Performed by: SURGERY

## 2020-07-27 PROCEDURE — 96372 THER/PROPH/DIAG INJ SC/IM: CPT | Mod: 59

## 2020-07-27 PROCEDURE — 99140 ANES COMP EMERGENCY COND: CPT | Performed by: NURSE ANESTHETIST, CERTIFIED REGISTERED

## 2020-07-27 PROCEDURE — G0463 HOSPITAL OUTPT CLINIC VISIT: HCPCS | Mod: 25

## 2020-07-27 PROCEDURE — 85652 RBC SED RATE AUTOMATED: CPT | Performed by: NURSE PRACTITIONER

## 2020-07-27 PROCEDURE — 36000056 ZZH SURGERY LEVEL 3 1ST 30 MIN: Performed by: SURGERY

## 2020-07-27 PROCEDURE — 46040 I&D ISCHIORCT&/PERIRCT ABSC: CPT | Performed by: NURSE ANESTHETIST, CERTIFIED REGISTERED

## 2020-07-27 PROCEDURE — 81001 URINALYSIS AUTO W/SCOPE: CPT | Performed by: NURSE PRACTITIONER

## 2020-07-27 PROCEDURE — 25000566 ZZH SEVOFLURANE, EA 15 MIN: Performed by: NURSE ANESTHETIST, CERTIFIED REGISTERED

## 2020-07-27 PROCEDURE — 25500064 ZZH RX 255 OP 636: Performed by: RADIOLOGY

## 2020-07-27 PROCEDURE — 99203 OFFICE O/P NEW LOW 30 MIN: CPT | Mod: 25 | Performed by: SURGERY

## 2020-07-27 PROCEDURE — 85025 COMPLETE CBC W/AUTO DIFF WBC: CPT | Performed by: NURSE PRACTITIONER

## 2020-07-27 RX ORDER — FENTANYL CITRATE 50 UG/ML
25-50 INJECTION, SOLUTION INTRAMUSCULAR; INTRAVENOUS
Status: DISCONTINUED | OUTPATIENT
Start: 2020-07-27 | End: 2020-07-27 | Stop reason: HOSPADM

## 2020-07-27 RX ORDER — KETOROLAC TROMETHAMINE 30 MG/ML
INJECTION, SOLUTION INTRAMUSCULAR; INTRAVENOUS PRN
Status: DISCONTINUED | OUTPATIENT
Start: 2020-07-27 | End: 2020-07-27

## 2020-07-27 RX ORDER — ONDANSETRON 2 MG/ML
4 INJECTION INTRAMUSCULAR; INTRAVENOUS EVERY 30 MIN PRN
Status: DISCONTINUED | OUTPATIENT
Start: 2020-07-27 | End: 2020-07-27 | Stop reason: HOSPADM

## 2020-07-27 RX ORDER — CEFTRIAXONE SODIUM 1 G
1 VIAL (EA) INJECTION ONCE
Status: COMPLETED | OUTPATIENT
Start: 2020-07-27 | End: 2020-07-27

## 2020-07-27 RX ORDER — HYDROMORPHONE HYDROCHLORIDE 1 MG/ML
.3-.5 INJECTION, SOLUTION INTRAMUSCULAR; INTRAVENOUS; SUBCUTANEOUS EVERY 10 MIN PRN
Status: DISCONTINUED | OUTPATIENT
Start: 2020-07-27 | End: 2020-07-27 | Stop reason: HOSPADM

## 2020-07-27 RX ORDER — ONDANSETRON 4 MG/1
4 TABLET, ORALLY DISINTEGRATING ORAL EVERY 30 MIN PRN
Status: DISCONTINUED | OUTPATIENT
Start: 2020-07-27 | End: 2020-07-27 | Stop reason: HOSPADM

## 2020-07-27 RX ORDER — OXYCODONE HYDROCHLORIDE 5 MG/1
5 TABLET ORAL 2 TIMES DAILY PRN
Status: DISCONTINUED | OUTPATIENT
Start: 2020-07-27 | End: 2020-07-27 | Stop reason: HOSPADM

## 2020-07-27 RX ORDER — HYDROMORPHONE HYDROCHLORIDE 1 MG/ML
0.5 INJECTION, SOLUTION INTRAMUSCULAR; INTRAVENOUS; SUBCUTANEOUS ONCE
Status: COMPLETED | OUTPATIENT
Start: 2020-07-27 | End: 2020-07-27

## 2020-07-27 RX ORDER — PROPOFOL 10 MG/ML
INJECTION, EMULSION INTRAVENOUS PRN
Status: DISCONTINUED | OUTPATIENT
Start: 2020-07-27 | End: 2020-07-27

## 2020-07-27 RX ORDER — IBUPROFEN 800 MG/1
800 TABLET, FILM COATED ORAL 3 TIMES DAILY
Qty: 42 TABLET | Refills: 0 | Status: SHIPPED | OUTPATIENT
Start: 2020-07-27 | End: 2020-08-12

## 2020-07-27 RX ORDER — ALBUTEROL SULFATE 0.83 MG/ML
2.5 SOLUTION RESPIRATORY (INHALATION) EVERY 4 HOURS PRN
Status: DISCONTINUED | OUTPATIENT
Start: 2020-07-27 | End: 2020-07-27 | Stop reason: HOSPADM

## 2020-07-27 RX ORDER — FENTANYL CITRATE 50 UG/ML
INJECTION, SOLUTION INTRAMUSCULAR; INTRAVENOUS PRN
Status: DISCONTINUED | OUTPATIENT
Start: 2020-07-27 | End: 2020-07-27

## 2020-07-27 RX ORDER — DEXAMETHASONE SODIUM PHOSPHATE 10 MG/ML
INJECTION, SOLUTION INTRAMUSCULAR; INTRAVENOUS PRN
Status: DISCONTINUED | OUTPATIENT
Start: 2020-07-27 | End: 2020-07-27

## 2020-07-27 RX ORDER — LIDOCAINE HYDROCHLORIDE 20 MG/ML
JELLY TOPICAL EVERY 4 HOURS PRN
Qty: 30 ML | Refills: 0 | Status: SHIPPED | OUTPATIENT
Start: 2020-07-27 | End: 2020-08-12

## 2020-07-27 RX ORDER — IOPAMIDOL 612 MG/ML
100 INJECTION, SOLUTION INTRAVASCULAR ONCE
Status: COMPLETED | OUTPATIENT
Start: 2020-07-27 | End: 2020-07-27

## 2020-07-27 RX ORDER — MEPERIDINE HYDROCHLORIDE 25 MG/ML
12.5 INJECTION INTRAMUSCULAR; INTRAVENOUS; SUBCUTANEOUS
Status: DISCONTINUED | OUTPATIENT
Start: 2020-07-27 | End: 2020-07-27 | Stop reason: HOSPADM

## 2020-07-27 RX ORDER — ONDANSETRON 2 MG/ML
INJECTION INTRAMUSCULAR; INTRAVENOUS PRN
Status: DISCONTINUED | OUTPATIENT
Start: 2020-07-27 | End: 2020-07-27

## 2020-07-27 RX ORDER — SODIUM CHLORIDE, SODIUM LACTATE, POTASSIUM CHLORIDE, CALCIUM CHLORIDE 600; 310; 30; 20 MG/100ML; MG/100ML; MG/100ML; MG/100ML
INJECTION, SOLUTION INTRAVENOUS CONTINUOUS PRN
Status: DISCONTINUED | OUTPATIENT
Start: 2020-07-27 | End: 2020-07-27

## 2020-07-27 RX ORDER — ONDANSETRON 4 MG/1
4 TABLET, ORALLY DISINTEGRATING ORAL
Status: DISCONTINUED | OUTPATIENT
Start: 2020-07-27 | End: 2020-07-27 | Stop reason: HOSPADM

## 2020-07-27 RX ORDER — SODIUM CHLORIDE, SODIUM LACTATE, POTASSIUM CHLORIDE, CALCIUM CHLORIDE 600; 310; 30; 20 MG/100ML; MG/100ML; MG/100ML; MG/100ML
INJECTION, SOLUTION INTRAVENOUS CONTINUOUS
Status: DISCONTINUED | OUTPATIENT
Start: 2020-07-27 | End: 2020-07-27 | Stop reason: HOSPADM

## 2020-07-27 RX ORDER — LIDOCAINE HYDROCHLORIDE 10 MG/ML
INJECTION, SOLUTION EPIDURAL; INFILTRATION; INTRACAUDAL; PERINEURAL
Status: COMPLETED
Start: 2020-07-27 | End: 2020-07-27

## 2020-07-27 RX ORDER — LABETALOL 20 MG/4 ML (5 MG/ML) INTRAVENOUS SYRINGE
10
Status: DISCONTINUED | OUTPATIENT
Start: 2020-07-27 | End: 2020-07-27 | Stop reason: HOSPADM

## 2020-07-27 RX ORDER — OXYCODONE HYDROCHLORIDE 5 MG/1
5-10 TABLET ORAL
Qty: 30 TABLET | Refills: 0 | Status: SHIPPED | OUTPATIENT
Start: 2020-07-27 | End: 2020-08-12

## 2020-07-27 RX ORDER — NALOXONE HYDROCHLORIDE 0.4 MG/ML
.1-.4 INJECTION, SOLUTION INTRAMUSCULAR; INTRAVENOUS; SUBCUTANEOUS
Status: DISCONTINUED | OUTPATIENT
Start: 2020-07-27 | End: 2020-07-27 | Stop reason: HOSPADM

## 2020-07-27 RX ADMIN — FENTANYL CITRATE 50 MCG: 50 INJECTION, SOLUTION INTRAMUSCULAR; INTRAVENOUS at 19:17

## 2020-07-27 RX ADMIN — DEXAMETHASONE SODIUM PHOSPHATE 10 MG: 10 INJECTION, SOLUTION INTRAMUSCULAR; INTRAVENOUS at 19:10

## 2020-07-27 RX ADMIN — OXYCODONE HYDROCHLORIDE 5 MG: 5 TABLET ORAL at 20:48

## 2020-07-27 RX ADMIN — FENTANYL CITRATE 100 MCG: 50 INJECTION, SOLUTION INTRAMUSCULAR; INTRAVENOUS at 19:03

## 2020-07-27 RX ADMIN — ONDANSETRON 4 MG: 2 INJECTION INTRAMUSCULAR; INTRAVENOUS at 19:28

## 2020-07-27 RX ADMIN — SODIUM CHLORIDE, POTASSIUM CHLORIDE, SODIUM LACTATE AND CALCIUM CHLORIDE: 600; 310; 30; 20 INJECTION, SOLUTION INTRAVENOUS at 19:00

## 2020-07-27 RX ADMIN — LIDOCAINE HYDROCHLORIDE 20 MG: 10 INJECTION, SOLUTION EPIDURAL; INFILTRATION; INTRACAUDAL; PERINEURAL at 15:30

## 2020-07-27 RX ADMIN — CEFTRIAXONE 1 G: 1 INJECTION, POWDER, FOR SOLUTION INTRAMUSCULAR; INTRAVENOUS at 15:30

## 2020-07-27 RX ADMIN — IOPAMIDOL 100 ML: 612 INJECTION, SOLUTION INTRAVENOUS at 14:42

## 2020-07-27 RX ADMIN — PROPOFOL 50 MG: 10 INJECTION, EMULSION INTRAVENOUS at 19:17

## 2020-07-27 RX ADMIN — KETOROLAC TROMETHAMINE 30 MG: 30 INJECTION, SOLUTION INTRAMUSCULAR at 19:25

## 2020-07-27 RX ADMIN — Medication 100 MG: at 19:07

## 2020-07-27 RX ADMIN — FENTANYL CITRATE 50 MCG: 50 INJECTION, SOLUTION INTRAMUSCULAR; INTRAVENOUS at 20:22

## 2020-07-27 RX ADMIN — HYDROMORPHONE HYDROCHLORIDE 0.5 MG: 1 INJECTION, SOLUTION INTRAMUSCULAR; INTRAVENOUS; SUBCUTANEOUS at 18:00

## 2020-07-27 RX ADMIN — FENTANYL CITRATE 50 MCG: 50 INJECTION, SOLUTION INTRAMUSCULAR; INTRAVENOUS at 19:25

## 2020-07-27 RX ADMIN — MIDAZOLAM 2 MG: 1 INJECTION INTRAMUSCULAR; INTRAVENOUS at 19:00

## 2020-07-27 RX ADMIN — PROPOFOL 200 MG: 10 INJECTION, EMULSION INTRAVENOUS at 19:07

## 2020-07-27 ASSESSMENT — ENCOUNTER SYMPTOMS
BLOOD IN STOOL: 1
ABDOMINAL DISTENTION: 0
RECTAL PAIN: 1
CARDIOVASCULAR NEGATIVE: 1
CONSTIPATION: 0
RESPIRATORY NEGATIVE: 1
ABDOMINAL PAIN: 0
NAUSEA: 0
VOMITING: 0
DIARRHEA: 0
ANAL BLEEDING: 0
DIAPHORESIS: 1

## 2020-07-27 ASSESSMENT — LIFESTYLE VARIABLES: TOBACCO_USE: 1

## 2020-07-27 NOTE — ED TRIAGE NOTES
"Pt presents today with c/o sharp pain in rectal area. Day 8. Rectal Exam was done in UC on friday, has been doing rectal cream, no relief. PT is suppose to see a surgeon on wednesday, but was told to come in due to increase pain, hurts to sit on right side, and sweats. Was told he needs more extensive testing. Denies abdominal pain, diarrhea, bloody stools. PT states his recent  BM was darker brown to black \"no blood when I wipe\".  Pt states stools have been soft, but hurts to come out.   "

## 2020-07-27 NOTE — DISCHARGE INSTRUCTIONS
Post-Anesthesia Patient Instructions    IMMEDIATELY FOLLOWING SURGERY:  Do not drive or operate machinery for the first twenty four hours after surgery.  Do not make any important decisions for twenty four hours after surgery or while taking narcotic pain medications or sedatives.  If you develop intractable nausea and vomiting or a severe headache please notify your doctor immediately.    FOLLOW-UP:  Please make an appointment with your surgeon as instructed. You do not need to follow up with anesthesia unless specifically instructed to do so.    WOUND CARE INSTRUCTIONS (if applicable):  Keep a dry clean dressing on the anesthesia/puncture wound site if there is drainage.  Once the wound has quit draining you may leave it open to air.  Generally you should leave the bandage intact for twenty four hours unless there is drainage.  If the epidural site drains for more than 36-48 hours please call the anesthesia department.    QUESTIONS?:  Please feel free to call your physician or the hospital  if you have any questions, and they will be happy to assist you.   What to expect when you have contrast    During your exam, we will inject  contrast  into your vein or artery. (Contrast is a clear liquid with iodine in it. It shows up on X-rays.)    You may feel warm or hot. You may have a metal taste in your mouth and a slight upset stomach. You may also feel pressure near the kidneys and bladder. These effects will last about 1 to 3 minutes.    Please tell us if you have:    Sneezing     Itching    Hives     Swelling in the face    A hoarse voice    Breathing problems    Other new symptoms    Serious problems are rare.  They may include:    Irregular heartbeat     Seizures    Kidney failure              Tissue damage    Shock      Death    If you have any problems during the exam, we  will treat them right away.    When you get home    Call your hospital if you have any new symptoms in the next 2 days, like  hives or swelling. (Phone numbers are at the bottom of this page.) Or call your family doctor.     If you have wheezing or trouble breathing, call 911.    Self-care  -Drink at least 4 extra glasses of water today.   This reduces the stress on your kidneys.  -Keep taking your regular medicines.    The contrast will pass out of your body in your  Urine(pee). This will happen in the next 24 hours. You  will not feel this. Your urine will not  change color.    If you have kidney problems or take metformin    Drink 4 to 8 large glasses of water for the next  2 days, if you are not on a fluid restriction.    ?If you take metformin (Glucophage or Glucovance) for diabetes, keep taking it.      ?Your kidney function tests are abnormal.  If you take Metformin, do not take it for 48 hours. Please go to your clinic for a blood test within 3 days after your exam before the restarting this medicine.     (Note to provider:please give patient prescription for lab tests.)    ?Special instructions: -    I have read and understand the above information.    Patient Sign Here:______________________________________Date:________Time:______    Staff Sign Here:________________________________________Date:_______Time:______      Radiology Departments:     ?[facility]: [phone] ?[place]: [phone]     ?[place]: [phone] ?[facility]:[phone]      ?[place]: [phone]  ?[place]: [phone]  ?[place]:[phone]    ?[facility]:[phone]  ?[facility]:[phone]

## 2020-07-27 NOTE — TELEPHONE ENCOUNTER
Spouse calling about last Sunday rectal pain started. Prep H and bath.Did cream for hemorrhoids. Pain got so bad went to UC on 7.24.2020.Do not see consent to discuss with spouse.She said take message and can call pt back.    Pain got really bad last night and hot sweats.Pain is severe.Hard to walk.Hard to have BM. Cant get into surgery possibly Wednesday but not confirmed.This is day 8.      This writer had advised UC/ER for triage.       Recommendation?    Can call pt back at [phone]      Jenna Heaton RN

## 2020-07-27 NOTE — ANESTHESIA PREPROCEDURE EVALUATION
Anesthesia Pre-Procedure Evaluation    Patient: Kaleb Juinor   MRN: [id number] : 1985          Preoperative Diagnosis: Perirectal abscess [K61.1]    Procedure(s):  EXAM UNDER ANESTHESIA, ANUS    History reviewed. No pertinent past medical history.  Past Surgical History:   Procedure Laterality Date     HAND SURGERY Right 2006     ORTHOPEDIC SURGERY      tendon repair right hand       Anesthesia Evaluation     . Pt has had prior anesthetic. Type: General    No history of anesthetic complications          ROS/MED HX    ENT/Pulmonary:     (+)tobacco use, Past use , recent URI resolved 20 COVID negative: . .    Neurologic:  - neg neurologic ROS     Cardiovascular:  - neg cardiovascular ROS       METS/Exercise Tolerance:  >4 METS   Hematologic:  - neg hematologic  ROS       Musculoskeletal:  - neg musculoskeletal ROS       GI/Hepatic:  - neg GI/hepatic ROS       Renal/Genitourinary:     (+) Other Renal/ Genitourinary, perirectal abscess      Endo:  - neg endo ROS       Psychiatric:  - neg psychiatric ROS       Infectious Disease:   (+) Recent Fever,       Malignancy:      - no malignancy   Other:    - neg other ROS                      Physical Exam  Normal systems: cardiovascular, pulmonary and dental    Airway   Mallampati: II  TM distance: >3 FB  Neck ROM: full    Dental     Cardiovascular   Rhythm and rate: regular and normal      Pulmonary    breath sounds clear to auscultation            Lab Results   Component Value Date    WBC 13.5 (H) 2020    HGB 14.2 2020    HCT 42.5 2020     2020    CRP 78.6 (H) 2020    SED 21 (H) 2020     2020    POTASSIUM 4.5 2020    CHLORIDE 107 2020    CO2 28 2020    BUN 11 2020    CR 0.79 2020     (H) 2020    JELANI 9.5 2020    ALBUMIN 3.8 2020    PROTTOTAL 8.0 2020    ALT 26 2020    AST 12 2020    ALKPHOS 94 2020    BILITOTAL 0.6 2020  "      Preop Vitals  BP Readings from Last 3 Encounters:   07/27/20 133/83   07/24/20 147/95   07/14/20 126/76    Pulse Readings from Last 3 Encounters:   07/27/20 74   07/24/20 103   07/14/20 97      Resp Readings from Last 3 Encounters:   07/27/20 16   07/24/20 16   07/14/20 16    SpO2 Readings from Last 3 Encounters:   07/27/20 99%   07/24/20 99%   07/14/20 98%      Temp Readings from Last 1 Encounters:   07/27/20 98.5  F (36.9  C) (Tympanic)    Ht Readings from Last 1 Encounters:   07/14/20 1.854 m (6' 1\")      Wt Readings from Last 1 Encounters:   07/14/20 97.1 kg (214 lb)    Estimated body mass index is 28.23 kg/m  as calculated from the following:    Height as of 7/14/20: 1.854 m (6' 1\").    Weight as of 7/14/20: 97.1 kg (214 lb).       Anesthesia Plan      History & Physical Review  History and physical reviewed and following examination; no interval change.    ASA Status:  3 emergent.    NPO Status:  Full stomach (crackers at 1pm with water)    Plan for General with Intravenous induction.   PONV prophylaxis:  Ondansetron (or other 5HT-3)  COVID unknown at this time, was negative 7/14/20  Labs reviewed  Discussed risks and benefits with patient for general anesthesia including sore throat, nausea, vomiting, aspiration, dental damage, loss of airway, CV complications, stroke, MI, death. Pt wishes to proceed.         Postoperative Care      Consents  Anesthetic plan, risks, benefits and alternatives discussed with:  Patient.  Use of blood products discussed: No .   .                 QIAN Blanchard CRNA  "

## 2020-07-27 NOTE — ED PROVIDER NOTES
History     Chief Complaint   Patient presents with     Rectal/perineal Pain     HPI  Kaleb Junior is a 35 year old male who is here with complaints of rectal pain. This has been going on for 8 days. He was seen in UC 3 days ago which exam showed no palpable hemorrhoid, fissure, or pain with palpation. He was referred to general surgery, which he can possibly get into this coming Wednesday (2 more days).     Since last night, rectal pain has increased and he has been getting some hot sweats. Describes the rectal pain as being deeper and more posterior which worsens when he sits on a chair and leans forward, when he is bearing down to have a bowel movement/urinate, and at sometimes when he is walking. It has been more difficult for him to push out BM and has noticed since this all started 8 days ago, that the caliber of his stool has gotten smaller.   Has not been able to pass gas for 8 days.  In the last 1-2 days, has had some darker brown/black stool. Stool is otherwise soft, taking stool softeners. No bright red blood on stool or toilet paper.     No fever or unintentional weight loss.   No other urinary symptoms.  Denies abdominal pain, nausea, vomiting.  Eating and drinking normally.     Currently rates his pain at a 4-5/10 and with the aggravating factors, increases to 6-7/10.     Home treatment: Preparation H, but no definitive visualization of hemorrhoids    2 weeks ago had URI symptoms with fever and cough. COVID testing was negative.   Denies significant back history.  No recent injury or falls.  Denies lower extremity weakness/paresthesias.  Denies saddle parasthesias, bowel/bladder incontinence.   Denies any obvious skin changes, swelling, erythema, drainage at/around the gluteal crease.    Allergies:  No Known Allergies    Problem List:    Patient Active Problem List    Diagnosis Date Noted     Perirectal abscess 07/27/2020     Priority: Medium     Added automatically from request for surgery  [id number]       NO ACTIVE PROBLEMS 03/09/2018     Priority: Medium        Past Medical History:    History reviewed. No pertinent past medical history.    Past Surgical History:    Past Surgical History:   Procedure Laterality Date     HAND SURGERY Right 2006     ORTHOPEDIC SURGERY      tendon repair right hand       Family History:    Family History   Problem Relation Age of Onset     Diabetes Paternal Grandmother        Social History:  Marital Status:  Single [1]  Social History     Tobacco Use     Smoking status: Former Smoker     Smokeless tobacco: Current User     Types: Chew   Substance Use Topics     Alcohol use: Yes     Comment: occasionaly      Drug use: No        Medications:    ibuprofen (ADVIL/MOTRIN) 800 MG tablet  lidocaine (XYLOCAINE) 2 % external gel  oxyCODONE (ROXICODONE) 5 MG tablet  psyllium (METAMUCIL/KONSYL) 58.6 % powder          Review of Systems   Constitutional: Positive for diaphoresis (intermittent x 2 days).   Respiratory: Negative.    Cardiovascular: Negative.    Gastrointestinal: Positive for blood in stool (reports of dark brown/black stool x 2-3 days) and rectal pain (deep pain with BM, straining to urinate, some sitting/walking). Negative for abdominal distention, abdominal pain, anal bleeding, constipation, diarrhea, nausea and vomiting.   Genitourinary: Negative.    Skin: Negative.        Physical Exam   BP: 133/83  Pulse: 74  Heart Rate: 98  Temp: 98.5  F (36.9  C)  Resp: 16  SpO2: 99 %      Physical Exam  Vitals signs and nursing note reviewed.   Constitutional:       Appearance: Normal appearance.   Cardiovascular:      Rate and Rhythm: Normal rate.   Pulmonary:      Effort: Pulmonary effort is normal.      Breath sounds: Normal breath sounds.   Abdominal:      General: Bowel sounds are normal. There is no distension.      Palpations: Abdomen is soft. There is no mass.      Tenderness: There is no abdominal tenderness. There is no right CVA tenderness, left CVA tenderness,  guarding or rebound.   Genitourinary:     Comments: Deferred rectal examination  Musculoskeletal:        Legs:       Comments: Deep pain with palpation to the mid gluteal crease. No spinal or para spinous muscle tenderness. Full back ROM. Lower extremity strength strong and equal bilaterally. CMS intact. Patellar reflexes strong and symmetric bilaterally.    Skin:     General: Skin is warm and dry.   Neurological:      Mental Status: He is alert.   Psychiatric:         Mood and Affect: Mood normal.         Behavior: Behavior normal.         Thought Content: Thought content normal.         ED Course     ED Course as of Jul 27 2059   Mon Jul 27, 2020   2264 Patient's wife is requesting that pt get something for pain. His pain has gradually been increasing while resting in UC room, waiting for surgery. Ordered 0.5 mg dilaudid IV.         Procedures      Results for orders placed or performed during the hospital encounter of 07/27/20 (from the past 24 hour(s))   CBC with platelets differential   Result Value Ref Range    WBC 13.5 (H) 4.0 - 11.0 10e9/L    RBC Count 4.82 4.4 - 5.9 10e12/L    Hemoglobin 14.2 13.3 - 17.7 g/dL    Hematocrit 42.5 40.0 - 53.0 %    MCV 88 78 - 100 fl    MCH 29.5 26.5 - 33.0 pg    MCHC 33.4 31.5 - 36.5 g/dL    RDW 11.9 10.0 - 15.0 %    Platelet Count 282 150 - 450 10e9/L    Diff Method Automated Method     % Neutrophils 79.6 %    % Lymphocytes 8.2 %    % Monocytes 9.9 %    % Eosinophils 1.3 %    % Basophils 0.3 %    % Immature Granulocytes 0.7 %    Nucleated RBCs 0 0 /100    Absolute Neutrophil 10.7 (H) 1.6 - 8.3 10e9/L    Absolute Lymphocytes 1.1 0.8 - 5.3 10e9/L    Absolute Monocytes 1.3 0.0 - 1.3 10e9/L    Absolute Eosinophils 0.2 0.0 - 0.7 10e9/L    Absolute Basophils 0.0 0.0 - 0.2 10e9/L    Abs Immature Granulocytes 0.1 0 - 0.4 10e9/L    Absolute Nucleated RBC 0.0    Comprehensive metabolic panel   Result Value Ref Range    Sodium 140 133 - 144 mmol/L    Potassium 4.5 3.4 - 5.3 mmol/L     Chloride 107 94 - 109 mmol/L    Carbon Dioxide 28 20 - 32 mmol/L    Anion Gap 5 3 - 14 mmol/L    Glucose 100 (H) 70 - 99 mg/dL    Urea Nitrogen 11 7 - 30 mg/dL    Creatinine 0.79 0.66 - 1.25 mg/dL    GFR Estimate >90 >60 mL/min/[1.73_m2]    GFR Estimate If Black >90 >60 mL/min/[1.73_m2]    Calcium 9.5 8.5 - 10.1 mg/dL    Bilirubin Total 0.6 0.2 - 1.3 mg/dL    Albumin 3.8 3.4 - 5.0 g/dL    Protein Total 8.0 6.8 - 8.8 g/dL    Alkaline Phosphatase 94 40 - 150 U/L    ALT 26 0 - 70 U/L    AST 12 0 - 45 U/L   CRP inflammation   Result Value Ref Range    CRP Inflammation 78.6 (H) 0.0 - 8.0 mg/L   Erythrocyte sedimentation rate auto   Result Value Ref Range    Sed Rate 21 (H) 0 - 15 mm/h   XR Abdomen 2 Views    Narrative    PROCEDURE: XR ABDOMEN 2 VW 7/27/2020 1:07 PM    HISTORY: 9 days deep rectal pain, decreased stool caliber    COMPARISONS: None.    TECHNIQUE: Supine and upright views.    FINDINGS: There is no free intraperitoneal air. Moderate amount of gas  and stool are seen within the colon to the level of the rectum. There  are no dilated gas-filled small bowel loops. No mass is seen and there  are no suspicious calcifications. There is no bony abnormality.         Impression    IMPRESSION: Moderate amount of gas and stool within the colon.    LIDA GILLIAM MD   UA reflex to Microscopic and Culture    Specimen: Urine clean catch; Midstream Urine   Result Value Ref Range    Color Urine Straw     Appearance Urine Clear     Glucose Urine Negative NEG^Negative mg/dL    Bilirubin Urine Negative NEG^Negative    Ketones Urine Negative NEG^Negative mg/dL    Specific Gravity Urine 1.010 1.003 - 1.035    Blood Urine Trace (A) NEG^Negative    pH Urine 5.5 4.7 - 8.0 pH    Protein Albumin Urine Negative NEG^Negative mg/dL    Urobilinogen mg/dL Normal 0.0 - 2.0 mg/dL    Nitrite Urine Negative NEG^Negative    Leukocyte Esterase Urine Negative NEG^Negative    Source Midstream Urine     RBC Urine 1 0 - 2 /HPF    WBC Urine <1 0 -  5 /HPF    Bacteria Urine None (A) NEG^Negative /HPF    Mucous Urine Present (A) NEG^Negative /LPF   CT Pelvis Soft Tissue w Contrast    Narrative    PROCEDURE: CT PELVIS SOFT TISSUE W CONTRAST 7/27/2020 2:50 PM    HISTORY: Abd pain, fever, abscess suspected; No ABD PAIN......8 days  deep rectal pain, decreased caliber of stool, diaphoresis, mild  leukocytosis, elevated ESR and CRP    COMPARISONS: None.    Meds/Dose Given: ISOVUE 300  100ML    TECHNIQUE: Axial postcontrast enhanced images with coronal and  sagittal reformatted images through the pelvis only.    FINDINGS: There is a focal fluid collection posterior to the distal  rectum, measuring approximately 2.8 x 2.6 x 2.4 cm. No gas is seen  within this collection. There is no significant inflammation in the  surrounding fat.    No significant inflammatory changes seen around the rectosigmoid  colon. There is some gas and stool within the colon. Prostate mildly  indents the base of the bladder.    No bone lesion is seen.         Impression    IMPRESSION: 2.8 cm fluid collection adjacent to the posterior inferior  rectum consistent with a small abscess. No significant surrounding  inflammation. No gas in collection.    LIDA GILLIAM MD         Assessments & Plan (with Medical Decision Making)   (K61.1) Perirectal abscess    35-year-old male here with an 8-day history of deeper rectal pain that has been worsening over the last couple days.  Pain is present with defecation, at times with sitting, and times of ambulation.  No bright red blood in the stool or upon wiping.  Questionable dark brown to black stools in the past 2 days.  He has noticed that he has had episodes of sweating intermittently in the past 2 days, otherwise he states he has been stable and has been eating and drinking without difficulty.  He is not having any abdominal pain, nausea, vomiting.    Lab work did show mild leukocytosis of 13.5 with a slight elevation absolute neutrophils.   Urinalysis did come back with trace amount of blood.  CRP of 78.6 and an ESR of 21.  CMP overall negative.  Abdominal x-ray did show moderate amount of stool and air.  I did consult with Dr. Mercer on-call who recommended obtaining a pelvis CT.  Pelvis CT with contrast did show an abscess distal rectum.     Dr. Mercer discussed case/options with pt and decision was made for him proceed with surgery this evening.     Pt did receive 1 gm ceftriaxone in UC.  Did have 0.5 mg dilaudid IV once he had some increase in pain. This provided adequate pain relief.  He was eventually transferred to OR under the care of Dr. Mercer.       I have reviewed the nursing notes.  I have reviewed the findings, diagnosis, plan and need for follow up with the patient.    Final diagnoses:   Perirectal abscess       7/27/2020   [facility]     Magdalena Harry, QIAN CNP  07/27/20 2100

## 2020-07-28 NOTE — OR NURSING
Took juice and crackers w/o nausea.Recta dressing reinforced with fluff.Mesh panty on.Stood at bedside w/o vertigo.Patient and responsible adult given discharge instructions with no questions regarding instructions. Bacilio score 19. Pain level 1/10.  Discharged from unit via w/c. Patient discharged to home

## 2020-07-28 NOTE — ADDENDUM NOTE
Addendum  created 07/27/20 2131 by Regina Rivera APRN CRNA    Intraprocedure LDAs edited, LDA properties accepted

## 2020-07-28 NOTE — ANESTHESIA POSTPROCEDURE EVALUATION
Patient: Kaleb Junior    Procedure(s):  EXAM UNDER ANESTHESIA, ANUS, SETON PLACEMENT, INCISION AND DRAINAGE PERIANAL ABSCESS    Diagnosis:Perirectal abscess [K61.1]  Diagnosis Additional Information: No value filed.    Anesthesia Type:  General    Note:  Anesthesia Post Evaluation    Patient location during evaluation: ICU and PACU  Patient participation: Able to fully participate in evaluation  Level of consciousness: awake and alert  Pain management: adequate  Airway patency: patent  Cardiovascular status: acceptable  Respiratory status: acceptable  Hydration status: acceptable  PONV: none     Anesthetic complications: None          Last vitals:  Vitals:    07/27/20 2027 07/27/20 2030 07/27/20 2035   BP: 129/72 121/68 121/77   Pulse: 92 93 98   Resp: 16 13 12   Temp:      SpO2:            Electronically Signed By: QIAN Blanchard CRNA  July 27, 2020  8:41 PM

## 2020-07-28 NOTE — ANESTHESIA CARE TRANSFER NOTE
Patient: Kaleb Junior    Procedure(s):  EXAM UNDER ANESTHESIA, ANUS, SETON PLACEMENT, INCISION AND DRAINAGE PERIANAL ABSCESS    Diagnosis: Perirectal abscess [K61.1]  Diagnosis Additional Information: No value filed.    Anesthesia Type:   General     Note:  Airway :Nasal Cannula  Patient transferred to:PACU  Handoff Report: Identifed the Patient, Identified the Reponsible Provider, Reviewed the pertinent medical history, Discussed the surgical course, Reviewed Intra-OP anesthesia mangement and issues during anesthesia, Set expectations for post-procedure period and Allowed opportunity for questions and acknowledgement of understanding      Vitals: (Last set prior to Anesthesia Care Transfer)    CRNA VITALS  7/27/2020 1930 - 7/27/2020 2015      7/27/2020             Resp Rate (set):  8                Electronically Signed By: QIAN Blanchard CRNA  July 27, 2020  8:15 PM

## 2020-07-28 NOTE — OP NOTE
[facility]   Operative Note    Pre-operative diagnosis: Perirectal abscess [K61.1]   Post-operative diagnosis Perianal abscess, perianal fistula    Procedure: Procedure(s):  EXAM UNDER ANESTHESIA, ANUS, SETON PLACEMENT, INCISION AND DRAINAGE PERIANAL ABSCESS   Surgeon(s): Surgeon(s) and Role:     * Marty Mercer MD - Primary   Estimated blood loss: * No values recorded between 7/27/2020  7:17 PM and 7/27/2020  7:53 PM *    Specimens: * No specimens in log *   Findings: There was and area of induration that lead to a cavity that decompressed through a fistula tract, seton left in place.      Description of procedure:   After confirming consent in the urgent care the patient was taken to the operating room placed supine on the table and general anesthesia was administered. The patient was then flipped over into the prone win-knife position and prepped and draped in usual fashion. Then taking liposomal bupivacaine the perineum was anesthetized. Then doing both digital and bivalve exam there was noted to be an area of induration in the posterior left perianal area which corresponded to the CT findings. Then using cautery a 1. 5 opening was made in the skin. There area was broken up digitally. There was noted to purulence being extruded out a posterior midline interior opening. Then using a penrose drain, was looped and secured to itself through both the internal and external opening. Hemostasis was ensured. He was awoken from anesthesia without issue and had minimal blood loss. Will likely get endoanal US prior to taking back for definitive surgery.       Marty Mercer MD

## 2020-08-11 NOTE — PATIENT INSTRUCTIONS
Thank you for allowing Dr. Mercer and our surgical team to participate in your care. Please call our health unit coordinator at [phone] with scheduling questions or the nurse at [phone] with any other questions or concerns.

## 2020-08-12 ENCOUNTER — OFFICE VISIT (OUTPATIENT)
Dept: SURGERY | Facility: OTHER | Age: 35
End: 2020-08-12
Attending: PHYSICIAN ASSISTANT
Payer: COMMERCIAL

## 2020-08-12 VITALS
TEMPERATURE: 97.6 F | OXYGEN SATURATION: 99 % | HEIGHT: 73 IN | BODY MASS INDEX: 28.36 KG/M2 | WEIGHT: 214 LBS | SYSTOLIC BLOOD PRESSURE: 122 MMHG | DIASTOLIC BLOOD PRESSURE: 74 MMHG | HEART RATE: 79 BPM

## 2020-08-12 DIAGNOSIS — K60.30 PERIANAL FISTULA: Primary | ICD-10-CM

## 2020-08-12 PROCEDURE — 99212 OFFICE O/P EST SF 10 MIN: CPT | Performed by: SURGERY

## 2020-08-12 ASSESSMENT — MIFFLIN-ST. JEOR: SCORE: 1954.58

## 2020-08-12 ASSESSMENT — PAIN SCALES - GENERAL: PAINLEVEL: NO PAIN (0)

## 2020-08-12 NOTE — PROGRESS NOTES
[facility] Progress Note    HPI:     7/27/2020: HPI:  This 35 year old year old male is seen at the request of Magdalena Harry for evaluation of higinio-anal pain. He reports that it has been going on for 8 days. Was seen in UC three days ago for pain and change in stool caliber. No abnormality noted at that time. Was referred to general surgery, pain significantly increased today with night sweats over night. He has never had anything drained before but has often felt twinges of pain. He has never had abdominal surgery before. He lives in [place]    CT scan:  IMPRESSION: 2.8 cm fluid collection adjacent to the posterior inferior  rectum consistent with a small abscess. No significant surrounding  inflammation. No gas in collection.    7/27/2020: After confirming consent in the urgent care the patient was taken to the operating room placed supine on the table and general anesthesia was administered. The patient was then flipped over into the prone win-knife position and prepped and draped in usual fashion. Then taking liposomal bupivacaine the perineum was anesthetized. Then doing both digital and bivalve exam there was noted to be an area of induration in the posterior left perianal area which corresponded to the CT findings. Then using cautery a 1. 5 opening was made in the skin. There area was broken up digitally. There was noted to purulence being extruded out a posterior midline interior opening. Then using a penrose drain, was looped and secured to itself through both the internal and external opening. Hemostasis was ensured. He was awoken from anesthesia without issue and had minimal blood loss. Will likely get endoanal US prior to taking back for definitive surgery      S: Doing much better, no significant drainage. No fevers, moving bowels normally, minimal symptoms from seton.     O:   Vitals:  /74 (BP Location: Left arm, Patient Position: Chair, Cuff Size: Adult Regular)   Pulse 79   Temp  "97.6  F (36.4  C) (Tympanic)   Ht 1.854 m (6' 1\")   Wt 97.1 kg (214 lb)   SpO2 99%   BMI 28.23 kg/m        Physical Exam:  G: alert oriented, no acute distress   ENT: sclera non-icteric   Pulm: no respiratory distress   CVS: RRR  ABD: external opening with seton in place, small drainage.   Ext: WWP     Assessment/Plan:    35 y.o. male with months of recurrent rectal pressure and pain, taken on 7/27 for urgent exam under anesthesia for concern over perianal abscess, found to to have posterior midline fistula. Discussed that would like to assess sphincter involvement before proceeding with fistulectomy with referral to pelvic floor center. Discussed the reasons for doing this and he is in agreement with proceeding.     Marty Mercer MD     "

## 2020-08-12 NOTE — NURSING NOTE
"Chief Complaint   Patient presents with     Surgical Followup     exam under anesthesia, seton placement, incision and drainage perianal abscess 7/27/2020.        Initial /74 (BP Location: Left arm, Patient Position: Chair, Cuff Size: Adult Regular)   Pulse 79   Temp 97.6  F (36.4  C) (Tympanic)   Ht 1.854 m (6' 1\")   Wt 97.1 kg (214 lb)   SpO2 99%   BMI 28.23 kg/m   Estimated body mass index is 28.23 kg/m  as calculated from the following:    Height as of this encounter: 1.854 m (6' 1\").    Weight as of this encounter: 97.1 kg (214 lb).  Medication Reconciliation: complete  WANG PATEL LPN    "

## 2020-08-20 ENCOUNTER — TRANSFERRED RECORDS (OUTPATIENT)
Dept: HEALTH INFORMATION MANAGEMENT | Facility: CLINIC | Age: 35
End: 2020-08-20

## 2020-08-25 ENCOUNTER — TELEPHONE (OUTPATIENT)
Dept: SURGERY | Facility: OTHER | Age: 35
End: 2020-08-25

## 2020-08-25 NOTE — TELEPHONE ENCOUNTER
Patient called in and stated he would like his lab results from the [facility] please call patient back at  [phone]

## 2020-08-26 DIAGNOSIS — K60.30 PERIANAL FISTULA: Primary | ICD-10-CM

## 2020-08-28 ENCOUNTER — TELEPHONE (OUTPATIENT)
Dept: SURGERY | Facility: CLINIC | Age: 35
End: 2020-08-28

## 2020-08-28 NOTE — TELEPHONE ENCOUNTER
[facility] Call Center    Phone Message    May a detailed message be left on voicemail: yes     Reason for Call: Other: Patient [place] ways away and wants to know if he can do his appointment for a Perianal Fistula virtually?  Writer informed patient it had to be in-person but he insisted I ask.  Please call patient back to confirm.     Action Taken: Message routed to:  [facility] CRS    Travel Screening: Not Applicable

## 2020-08-28 NOTE — TELEPHONE ENCOUNTER
RECORDS RECEIVED FROM: Dr. Marty Mercer-  Clinic [facility]    DATE RECEIVED: 9/28/2020   NOTES STATUS DETAILS   OFFICE NOTE from referring provider  Internal 8/12/2020 Office visit with Dr. Mercer    OFFICE NOTE from other specialist   N/A    DISCHARGE SUMMARY from hospital  N/A    DISCHARGE REPORT from the ER Internal 7/24/2020 ([facility])    OPERATIVE REPORT  N/A    MEDICATION LIST Internal    LABS     PFC TESTING N/A    ANAL PAP N/A    BIOPSIES/PATHOLOGY RELATED TO DIAGNOSIS N/A    DIAGNOSTIC PROCEDURES     COLONOSCOPY N/A    UPPER ENDOSCOPY (EGD) N/A    FLEX SIGMOIDOSCOPY  N/A    ERCP N/A    IMAGING (DISC & REPORT)      CT  Internal CT Abdomen Pelvis: 7/27/2020   MRI N/A    XRAY N/A    ULTRASOUND (ENDOANAL/ENDORECTAL) N/A

## 2020-09-01 ENCOUNTER — TELEPHONE (OUTPATIENT)
Dept: SURGERY | Facility: CLINIC | Age: 35
End: 2020-09-01

## 2020-09-01 NOTE — TELEPHONE ENCOUNTER
Pt states he is having more issues with his fistula, bleeding, drainage, pain that causes him to wake up in the middle of the night. Told him he can come in at 11:30 tomorrow. He said he would call me back to confirm that appt. Confirmed appt

## 2020-09-01 NOTE — TELEPHONE ENCOUNTER
[facility] Call Center    Phone Message    May a detailed message be left on voicemail: yes     Reason for Call: Other: Pt called in and stated that he is wanting to discuss his condition and the fact that it is worsening. Pt thinks that he will need a sooner appointment than the one he currently has on 9/28/2020. Please advise     Action Taken: Message routed to:  [facility] Colon Rect SUrg    Travel Screening: Not Applicable

## 2020-09-02 ENCOUNTER — OFFICE VISIT (OUTPATIENT)
Dept: SURGERY | Facility: CLINIC | Age: 35
End: 2020-09-02
Attending: SURGERY
Payer: COMMERCIAL

## 2020-09-02 VITALS
HEIGHT: 73 IN | WEIGHT: 213.7 LBS | TEMPERATURE: 99.1 F | DIASTOLIC BLOOD PRESSURE: 82 MMHG | OXYGEN SATURATION: 97 % | BODY MASS INDEX: 28.32 KG/M2 | HEART RATE: 74 BPM | SYSTOLIC BLOOD PRESSURE: 123 MMHG

## 2020-09-02 DIAGNOSIS — K60.30 ANAL FISTULA: Primary | ICD-10-CM

## 2020-09-02 RX ORDER — PSYLLIUM SEED (WITH DEXTROSE)
POWDER (GRAM) ORAL
COMMUNITY
End: 2024-03-18

## 2020-09-02 ASSESSMENT — PAIN SCALES - GENERAL: PAINLEVEL: MILD PAIN (3)

## 2020-09-02 ASSESSMENT — MIFFLIN-ST. JEOR: SCORE: 1953.22

## 2020-09-02 NOTE — NURSING NOTE
"Chief Complaint   Patient presents with     Consult     Perianal fistulas.       Vitals:    09/02/20 1135   BP: 123/82   BP Location: Left arm   Patient Position: Sitting   Cuff Size: Adult Large   Pulse: 74   Temp: 99.1  F (37.3  C)   TempSrc: Oral   SpO2: 97%   Weight: 213 lb 11.2 oz   Height: 6' 1\"       Body mass index is 28.19 kg/m .      Justino Burns, EMT                      "

## 2020-09-02 NOTE — LETTER
2020     RE: Kaleb Junior  [address]  [facility] [address]     Dear Colleague,    Thank you for referring your patient, Kaleb Junior, to the [facility] COLON AND RECTAL SURGERY at [facility]. Please see a copy of my visit note below.    Colon and Rectal Surgery Consult Clinic Note    Date: 2020     Referring provider:  Marty Mercer MD  [address] [place] [address]     RE: Kaleb Junior  : 1985  LIZETH: 2020    Kaleb Junior is a very pleasant 35 year old male without a significant past medical history with a recent diagnosis of perianal fistula.  Given these findings they were subsequently sent to the Colon and Rectal Surgery Clinic for an opinion on this and a new patient consultation.     Kaleb developed a perianal abscess and had an examination under anesthesia with I&D of abscess and seton placement by Dr. Marty Mercer in [place] on 2020. He reportedly saw Dr. Mercer postoperatively and was sent for an endoanal ultrasound at the pelvic floor center to further evaluate the fistula tract. Dr. Mercer recommended that Kaleb follow up here for further surgical management of his fistula. After his ultrasound he began having worsening pain at night. This resolves after a bowel movement and feels like it is right at his seton site. He has also had some increased purulent drainage and bleeding. No fevers or chills.   He is otherwise healthy. He has never had any anorectal disease in the past. He has normal bowel movements with daily Metamucil use. No diarrhea. No prior anorectal surgeries. He denies any difficulty holding stool or flatus.  No family history of inflammatory bowel disease. Maternal grandmother with rectal cancer. He has never had a colonoscopy.    Physical Examination: Exam was chaperoned by ROXANNA Reyes   /82 (BP Location: Left arm, Patient Position: Sitting, Cuff Size: Adult Large)   Pulse 74   Temp 99.1  F (37.3  C)  "(Oral)   Ht 6' 1\"   Wt 213 lb 11.2 oz   SpO2 97%   BMI 28.19 kg/m           General: alert, oriented, in no acute distress, sitting comfortably  HEENT: mucous membranes moist  Perianal external examination:  Red vessel loop seton in place in the left posterior position. Ties were within the tract and there was some hypergranulation tissue at the the external opening. I rotated the ties outside the tract and applied silver nitrate applied to hypergranulation tissue. Only three ties in place so I placed two more silk ties.  Digital rectal examination: Was deferred    Anoscopy: Was deferred    Assessment/Plan: 35 year old male with anal fistula with seton in place. Discussed that he will likely needs additional surgical intervention to definitively manage the fistula tract and try to get rid of the seton. I unfortunately do not have his endoanal ultrasound results today. I cannot determine from the OP note what the muscle involvement is. Operative management will likely be determined based on ultrasound results. We will get these and then set him up for a virtual visit with one of the surgeons to discuss further.  I rotated the ties outside the tract and hope this will help facilitate drainage and help with the pain. Silver nitrate applied to hypergranulation tissue to see if this helps with the bleeding. Asked him to notify the clinic if pain does not improve or with any questions or concerns.  Patient's questions were answered to his stated satisfaction and he is in agreement with this plan.    Medical history:  No past medical history on file.    Surgical history:  Past Surgical History:   Procedure Laterality Date     EXAM UNDER ANESTHESIA ANUS N/A 7/27/2020    Procedure: EXAM UNDER ANESTHESIA, ANUS, SETON PLACEMENT, INCISION AND DRAINAGE PERIANAL ABSCESS;  Surgeon: Marty Mercer MD;  Location: [facility]     HAND SURGERY Right 2006     ORTHOPEDIC SURGERY      tendon repair right hand       Problem list:  Patient " "Active Problem List    Diagnosis Date Noted     Perirectal abscess 07/27/2020     Priority: Medium     Added automatically from request for surgery [id number]       NO ACTIVE PROBLEMS 03/09/2018     Priority: Medium       Medications:  Current Outpatient Medications   Medication Sig Dispense Refill     Psyllium (METAMUCIL) 48.57 % POWD        triamcinolone (KENALOG) 0.1 % external cream Apply topically 2 times daily (Patient not taking: Reported on 8/12/2020) 80 g 1       Allergies:  No Known Allergies    Family history:  Family History   Problem Relation Age of Onset     Diabetes Paternal Grandmother        Social history:  Social History     Tobacco Use     Smoking status: Former Smoker     Smokeless tobacco: Former User     Types: Chew     Quit date: 9/2/2018   Substance Use Topics     Alcohol use: Yes     Comment: occasionaly     Marital status: single.  Occupation: .    Nursing Notes:   Justino Geronimo EMT  9/2/2020 12:09 PM  Signed  Chief Complaint   Patient presents with     Consult     Perianal fistulas.       Vitals:    09/02/20 1135   BP: 123/82   BP Location: Left arm   Patient Position: Sitting   Cuff Size: Adult Large   Pulse: 74   Temp: 99.1  F (37.3  C)   TempSrc: Oral   SpO2: 97%   Weight: 213 lb 11.2 oz   Height: 6' 1\"       Body mass index is 28.19 kg/m .      ROXANNA Reyes      Total face to face time was 30 minutes, >50% counseling.    QIAN Nuñez, NP-C  Colon and Rectal Surgery   [facility]    This note was created using speech recognition software and may contain unintended word substitutions.      "

## 2020-09-02 NOTE — PROGRESS NOTES
"Colon and Rectal Surgery Consult Clinic Note    Date: 2020     Referring provider:  Marty Mercer MD  [address] [place] [address]     RE: Kaleb Junior  : 1985  LIZETH: 2020    Kaleb Junior is a very pleasant 35 year old male without a significant past medical history with a recent diagnosis of perianal fistula.  Given these findings they were subsequently sent to the Colon and Rectal Surgery Clinic for an opinion on this and a new patient consultation.     Kaleb developed a perianal abscess and had an examination under anesthesia with I&D of abscess and seton placement by Dr. Marty Mercer in [place] on 2020. He reportedly saw Dr. Mercer postoperatively and was sent for an endoanal ultrasound at the pelvic floor center to further evaluate the fistula tract. Dr. Mercer recommended that Kaleb follow up here for further surgical management of his fistula. After his ultrasound he began having worsening pain at night. This resolves after a bowel movement and feels like it is right at his seton site. He has also had some increased purulent drainage and bleeding. No fevers or chills.   He is otherwise healthy. He has never had any anorectal disease in the past. He has normal bowel movements with daily Metamucil use. No diarrhea. No prior anorectal surgeries. He denies any difficulty holding stool or flatus.  No family history of inflammatory bowel disease. Maternal grandmother with rectal cancer. He has never had a colonoscopy.    Physical Examination: Exam was chaperoned by Justino Burns, EMT   /82 (BP Location: Left arm, Patient Position: Sitting, Cuff Size: Adult Large)   Pulse 74   Temp 99.1  F (37.3  C) (Oral)   Ht 6' 1\"   Wt 213 lb 11.2 oz   SpO2 97%   BMI 28.19 kg/m           General: alert, oriented, in no acute distress, sitting comfortably  HEENT: mucous membranes moist  Perianal external examination:  Red vessel loop seton in place in the left posterior position. Ties " were within the tract and there was some hypergranulation tissue at the the external opening. I rotated the ties outside the tract and applied silver nitrate applied to hypergranulation tissue. Only three ties in place so I placed two more silk ties.  Digital rectal examination: Was deferred    Anoscopy: Was deferred    Assessment/Plan: 35 year old male with anal fistula with seton in place. Discussed that he will likely needs additional surgical intervention to definitively manage the fistula tract and try to get rid of the seton. I unfortunately do not have his endoanal ultrasound results today. I cannot determine from the OP note what the muscle involvement is. Operative management will likely be determined based on ultrasound results. We will get these and then set him up for a virtual visit with one of the surgeons to discuss further.  I rotated the ties outside the tract and hope this will help facilitate drainage and help with the pain. Silver nitrate applied to hypergranulation tissue to see if this helps with the bleeding. Asked him to notify the clinic if pain does not improve or with any questions or concerns.  Patient's questions were answered to his stated satisfaction and he is in agreement with this plan.    Medical history:  No past medical history on file.    Surgical history:  Past Surgical History:   Procedure Laterality Date     EXAM UNDER ANESTHESIA ANUS N/A 7/27/2020    Procedure: EXAM UNDER ANESTHESIA, ANUS, SETON PLACEMENT, INCISION AND DRAINAGE PERIANAL ABSCESS;  Surgeon: Marty Mercer MD;  Location: [facility]     HAND SURGERY Right 2006     ORTHOPEDIC SURGERY      tendon repair right hand       Problem list:  Patient Active Problem List    Diagnosis Date Noted     Perirectal abscess 07/27/2020     Priority: Medium     Added automatically from request for surgery [id number]       NO ACTIVE PROBLEMS 03/09/2018     Priority: Medium       Medications:  Current Outpatient Medications   Medication  "Sig Dispense Refill     Psyllium (METAMUCIL) 48.57 % POWD        triamcinolone (KENALOG) 0.1 % external cream Apply topically 2 times daily (Patient not taking: Reported on 8/12/2020) 80 g 1       Allergies:  No Known Allergies    Family history:  Family History   Problem Relation Age of Onset     Diabetes Paternal Grandmother        Social history:  Social History     Tobacco Use     Smoking status: Former Smoker     Smokeless tobacco: Former User     Types: Chew     Quit date: 9/2/2018   Substance Use Topics     Alcohol use: Yes     Comment: occasionaly     Marital status: single.  Occupation: .    Nursing Notes:   Justino Geronimo EMT  9/2/2020 12:09 PM  Signed  Chief Complaint   Patient presents with     Consult     Perianal fistulas.       Vitals:    09/02/20 1135   BP: 123/82   BP Location: Left arm   Patient Position: Sitting   Cuff Size: Adult Large   Pulse: 74   Temp: 99.1  F (37.3  C)   TempSrc: Oral   SpO2: 97%   Weight: 213 lb 11.2 oz   Height: 6' 1\"       Body mass index is 28.19 kg/m .      ROXANNA Reyes                         Total face to face time was 30 minutes, >50% counseling.    QIAN Nuñez, NP-C  Colon and Rectal Surgery   [facility]    This note was created using speech recognition software and may contain unintended word substitutions.    "

## 2020-09-08 NOTE — PROGRESS NOTES
"Colon and Rectal Surgery Consult Telephone Note    Date: 2020     Referring provider:  Referred Self, MD  No address on file     RE: Kaleb Junior  : 1985  LIZETH: 2020    Kaleb Junior is a 35 year old male who is being evaluated via a billable telephone visit.      The patient has been notified of following:     \"This telephone visit will be conducted via a call between you and your physician/provider. We have found that certain health care needs can be provided without the need for a physical exam.  This service lets us provide the care you need with a short phone conversation.  If a prescription is necessary we can send it directly to your pharmacy.  If lab work is needed we can place an order for that and you can then stop by our lab to have the test done at a later time.    If during the course of the call the physician/provider feels a telephone visit is not appropriate, you will not be charged for this service.\"     Patient has given verbal consent for telephone visit? Yes    Phone call duration: 15 minutes     Kaleb Junior is a very pleasant 35 year old male without a significant past medical history with a recent diagnosis of a complex perirectal fistula.  Given these findings they were subsequently sent to the Colon and Rectal Surgery Clinic for an opinion on this and a new patient consultation.     On 20, he underwent I&D of abscess with seton placement. He recently underwent endoanal U/S which revealed a transsphincteric fistula with extension into intersphincteric space and tracking in a horseshoe configuration.     He reports overall he is doing well. No fevers or chills, denies any more perianal pain since repositioning of the seton. Does have some mild thick drainage present. Remains doing sitz baths, keeping area clean. Having miralax daily, reports soft stools. No issues with incontinence to gas or stool.     Assessment/Plan: 35 year old male with a recent diagnosis perianal " abscess complicated by TS fistula with horseshoe configuration.    1. At this time, he reports he is doing well with the seton in place without any evidence of recurrent infection. Given the complex nature of the fistula the relatively short duration since seton placement, I would like to continue monitor with seton in place and reevaluate with MRI fistula protocol in two months. This is to allow for continued healing of the fistula given appropriate source control.    2. Also given complex fistula and hx of rectal cancer in family, I would like for him to have a colonoscopy to rule out other potential contributory pathology.    3. He will follow up after colonoscopy and MRI. Thereafter, plan for EUA, possible replacement or additional placement of seton, possible LIFT should the fistula continue to improve in healing with resolution of the acute inflammatory process.      Medical history:  None     Surgical history:  Past Surgical History:   Procedure Laterality Date     EXAM UNDER ANESTHESIA ANUS N/A 7/27/2020    Procedure: EXAM UNDER ANESTHESIA, ANUS, SETON PLACEMENT, INCISION AND DRAINAGE PERIANAL ABSCESS;  Surgeon: Marty Mercer MD;  Location: [facility]     HAND SURGERY Right 2006     ORTHOPEDIC SURGERY      tendon repair right hand       Problem list:  Patient Active Problem List    Diagnosis Date Noted     Perirectal abscess 07/27/2020     Priority: Medium     Added automatically from request for surgery [id number]       NO ACTIVE PROBLEMS 03/09/2018     Priority: Medium       Medications:  Current Outpatient Medications   Medication Sig Dispense Refill     Psyllium (METAMUCIL) 48.57 % POWD        triamcinolone (KENALOG) 0.1 % external cream Apply topically 2 times daily (Patient not taking: Reported on 8/12/2020) 80 g 1       Allergies:  No Known Allergies    Family history:  Family History   Problem Relation Age of Onset     Diabetes Paternal Grandmother    Maternal grandmother with hx of rectal cancer, no hx  of IBD.    Social history:  Social History     Tobacco Use     Smoking status: Former Smoker     Smokeless tobacco: Former User     Types: Chew     Quit date: 9/2/2018   Substance Use Topics     Alcohol use: Yes     Comment: occasionaly     Marital status: single.    There are no exam notes on file for this visit.       Kevin Nassar MD  Division of Colon and Rectal Surgery  [facility]  [phone]

## 2020-09-09 ENCOUNTER — MYC MEDICAL ADVICE (OUTPATIENT)
Dept: FAMILY MEDICINE | Facility: OTHER | Age: 35
End: 2020-09-09

## 2020-09-09 ENCOUNTER — VIRTUAL VISIT (OUTPATIENT)
Dept: SURGERY | Facility: CLINIC | Age: 35
End: 2020-09-09
Payer: COMMERCIAL

## 2020-09-09 ENCOUNTER — TELEPHONE (OUTPATIENT)
Dept: SURGERY | Facility: CLINIC | Age: 35
End: 2020-09-09

## 2020-09-09 VITALS — BODY MASS INDEX: 28.23 KG/M2 | HEIGHT: 73 IN | WEIGHT: 213 LBS

## 2020-09-09 DIAGNOSIS — K60.30 ANAL FISTULA: Primary | ICD-10-CM

## 2020-09-09 ASSESSMENT — MIFFLIN-ST. JEOR: SCORE: 1950.04

## 2020-09-09 ASSESSMENT — PAIN SCALES - GENERAL: PAINLEVEL: NO PAIN (0)

## 2020-09-09 NOTE — NURSING NOTE
"Chief Complaint   Patient presents with     Consult     perianal fistula       Vitals:    09/09/20 1025   Weight: 213 lb   Height: 6' 1\"       Body mass index is 28.1 kg/m .      Shree Reed LPN                        "

## 2020-09-09 NOTE — LETTER
2020       RE: Kaleb Junior  [address]  [facility] [address]     Dear Colleague,    Thank you for referring your patient, Kaleb Junior, to the [facility] COLON AND RECTAL SURGERY at [facility]. Please see a copy of my visit note below.    Colon and Rectal Surgery Consult Telephone Note    Date: 2020     Referring provider:  Referred Self, MD  No address on file     RE: Kaleb Junior  : 1985  LIZETH: 2020     Kaleb Junior is a very pleasant 35 year old male without a significant past medical history with a recent diagnosis of a complex perirectal fistula.  Given these findings they were subsequently sent to the Colon and Rectal Surgery Clinic for an opinion on this and a new patient consultation.     On 20, he underwent I&D of abscess with seton placement. He recently underwent endoanal U/S which revealed a transsphincteric fistula with extension into intersphincteric space and tracking in a horseshoe configuration.     He reports overall he is doing well. No fevers or chills, denies any more perianal pain since repositioning of the seton. Does have some mild thick drainage present. Remains doing sitz baths, keeping area clean. Having miralax daily, reports soft stools. No issues with incontinence to gas or stool.     Assessment/Plan: 35 year old male with a recent diagnosis perianal abscess complicated by TS fistula with horseshoe configuration.    1. At this time, he reports he is doing well with the seton in place without any evidence of recurrent infection. Given the complex nature of the fistula the relatively short duration since seton placement, I would like to continue monitor with seton in place and reevaluate with MRI fistula protocol in two months. This is to allow for continued healing of the fistula given appropriate source control.    2. Also given complex fistula and hx of rectal cancer in family, I would like for him to have a colonoscopy to  rule out other potential contributory pathology.    3. He will follow up after colonoscopy and MRI. Thereafter, plan for EUA, possible replacement or additional placement of seton, possible LIFT should the fistula continue to improve in healing with resolution of the acute inflammatory process.    Medical history:  None     Surgical history:  Past Surgical History:   Procedure Laterality Date     EXAM UNDER ANESTHESIA ANUS N/A 7/27/2020    Procedure: EXAM UNDER ANESTHESIA, ANUS, SETON PLACEMENT, INCISION AND DRAINAGE PERIANAL ABSCESS;  Surgeon: Marty Mercer MD;  Location: [facility]     HAND SURGERY Right 2006     ORTHOPEDIC SURGERY      tendon repair right hand     Problem list:  Patient Active Problem List    Diagnosis Date Noted     Perirectal abscess 07/27/2020     Priority: Medium     Added automatically from request for surgery [id number]       NO ACTIVE PROBLEMS 03/09/2018     Priority: Medium     Medications:  Current Outpatient Medications   Medication Sig Dispense Refill     Psyllium (METAMUCIL) 48.57 % POWD        triamcinolone (KENALOG) 0.1 % external cream Apply topically 2 times daily (Patient not taking: Reported on 8/12/2020) 80 g 1     Allergies:  No Known Allergies          Family history:  Family History   Problem Relation Age of Onset     Diabetes Paternal Grandmother    Maternal grandmother with hx of rectal cancer, no hx of IBD.    Social history:  Social History     Tobacco Use     Smoking status: Former Smoker     Smokeless tobacco: Former User     Types: Chew     Quit date: 9/2/2018   Substance Use Topics     Alcohol use: Yes     Comment: occasionaly     Marital status: single.    There are no exam notes on file for this visit.     Again, thank you for allowing me to participate in the care of your patient.  Sincerely,    Kevin Nassar MD  Division of Colon and Rectal Surgery  [facility]  [phone]

## 2020-09-09 NOTE — TELEPHONE ENCOUNTER
Spoke with pt and he will call and get MRI 3 T scheduled once appt is scheduled will call pt back and get provider apppt scheduled.

## 2020-09-11 ENCOUNTER — MYC MEDICAL ADVICE (OUTPATIENT)
Dept: SURGERY | Facility: OTHER | Age: 35
End: 2020-09-11

## 2020-09-11 NOTE — TELEPHONE ENCOUNTER
Jefferson Manuel and Joana,  Dr Macias forwarded your message to us.   We can set up a colonoscopy for Kaleb at his convenience.  We have open days on October 1, October 8, October 22 and October 29 with Dr Mercer.  All are Thursdays.  There is a bowel prep he would need to take the day before and we would need to do a covid test 3-5 days prior to the date you pick.  Let me know if any of those days works for you, and I will send all of the information needed for the prep and the colonoscopy.  If you prefer, call me at the clinic at [phone] or I can send everything via [employer] if that is what works better, also.  Thanks!  Magdalena/Dr Mercer

## 2020-09-14 ENCOUNTER — PREP FOR PROCEDURE (OUTPATIENT)
Dept: SURGERY | Facility: OTHER | Age: 35
End: 2020-09-14

## 2020-09-14 DIAGNOSIS — Z80.0 FAMILY HISTORY OF COLON CANCER: Primary | ICD-10-CM

## 2020-09-14 DIAGNOSIS — K60.30 ANAL FISTULA: ICD-10-CM

## 2020-09-25 ENCOUNTER — ANESTHESIA EVENT (OUTPATIENT)
Dept: SURGERY | Facility: HOSPITAL | Age: 35
End: 2020-09-25
Payer: COMMERCIAL

## 2020-09-25 ASSESSMENT — LIFESTYLE VARIABLES: TOBACCO_USE: 1

## 2020-09-25 NOTE — ANESTHESIA PREPROCEDURE EVALUATION
Anesthesia Pre-Procedure Evaluation    Patient: Kaleb Junior   MRN: [id number] : 1985          Preoperative Diagnosis: Family history of colon cancer [Z80.0]  Anal fistula [K60.3]    Procedure(s):  colonoscopy possible biopsy possible polypectomy    History reviewed. No pertinent past medical history.  Past Surgical History:   Procedure Laterality Date     EXAM UNDER ANESTHESIA ANUS N/A 2020    Procedure: EXAM UNDER ANESTHESIA, ANUS, SETON PLACEMENT, INCISION AND DRAINAGE PERIANAL ABSCESS;  Surgeon: Marty Mercer MD;  Location: [facility]     HAND SURGERY Right 2006     ORTHOPEDIC SURGERY      tendon repair right hand       Anesthesia Evaluation     . Pt has had prior anesthetic. Type: General    No history of anesthetic complications          ROS/MED HX    ENT/Pulmonary:     (+)tobacco use, Past use , recent URI resolved 20 COVID negative: . .    Neurologic:  - neg neurologic ROS     Cardiovascular:  - neg cardiovascular ROS       METS/Exercise Tolerance:  >4 METS   Hematologic:  - neg hematologic  ROS       Musculoskeletal:  - neg musculoskeletal ROS       GI/Hepatic:  - neg GI/hepatic ROS   (+) bowel prep,       Renal/Genitourinary:     (+) Other Renal/ Genitourinary, perirectal abscess      Endo:  - neg endo ROS       Psychiatric:  - neg psychiatric ROS       Infectious Disease:   (+) Recent Fever,       Malignancy:      - no malignancy   Other:    - neg other ROS                      Physical Exam  Normal systems: cardiovascular, pulmonary and dental    Airway   Mallampati: II  TM distance: >3 FB  Neck ROM: full    Dental     Cardiovascular   Rhythm and rate: regular and normal      Pulmonary    breath sounds clear to auscultation            Lab Results   Component Value Date    WBC 13.5 (H) 2020    HGB 14.2 2020    HCT 42.5 2020     2020    CRP 78.6 (H) 2020    SED 21 (H) 2020     2020    POTASSIUM 4.5 2020    CHLORIDE 107 2020  "   CO2 28 07/27/2020    BUN 11 07/27/2020    CR 0.79 07/27/2020     (H) 07/27/2020    JELANI 9.5 07/27/2020    ALBUMIN 3.8 07/27/2020    PROTTOTAL 8.0 07/27/2020    ALT 26 07/27/2020    AST 12 07/27/2020    ALKPHOS 94 07/27/2020    BILITOTAL 0.6 07/27/2020       Preop Vitals  BP Readings from Last 3 Encounters:   09/02/20 123/82   08/12/20 122/74   07/27/20 129/82    Pulse Readings from Last 3 Encounters:   09/02/20 74   08/12/20 79   07/27/20 97      Resp Readings from Last 3 Encounters:   07/27/20 20   07/24/20 16   07/14/20 16    SpO2 Readings from Last 3 Encounters:   09/02/20 97%   08/12/20 99%   07/27/20 99%      Temp Readings from Last 1 Encounters:   09/02/20 99.1  F (37.3  C) (Oral)    Ht Readings from Last 1 Encounters:   09/09/20 1.854 m (6' 1\")      Wt Readings from Last 1 Encounters:   09/09/20 96.6 kg (213 lb)    Estimated body mass index is 28.1 kg/m  as calculated from the following:    Height as of 9/9/20: 1.854 m (6' 1\").    Weight as of 9/9/20: 96.6 kg (213 lb).       Anesthesia Plan      History & Physical Review  History and physical reviewed and following examination; no interval change.    ASA Status:  2 .    NPO Status:  > 8 hours    Plan for MAC with Intravenous and Propofol induction. Maintenance will be TIVA.  Reason for MAC:  Deep or markedly invasive procedure (G8)  PONV prophylaxis:  Ondansetron (or other 5HT-3)         Postoperative Care  Postoperative pain management:  IV analgesics.      Consents  Anesthetic plan, risks, benefits and alternatives discussed with:  Patient and Spouse..                 QIAN Fenton CRNA  "

## 2020-09-26 ENCOUNTER — OFFICE VISIT (OUTPATIENT)
Dept: FAMILY MEDICINE | Facility: OTHER | Age: 35
End: 2020-09-26
Attending: FAMILY MEDICINE
Payer: COMMERCIAL

## 2020-09-26 DIAGNOSIS — Z80.0 FAMILY HISTORY OF COLON CANCER: ICD-10-CM

## 2020-09-26 PROCEDURE — U0003 INFECTIOUS AGENT DETECTION BY NUCLEIC ACID (DNA OR RNA); SEVERE ACUTE RESPIRATORY SYNDROME CORONAVIRUS 2 (SARS-COV-2) (CORONAVIRUS DISEASE [COVID-19]), AMPLIFIED PROBE TECHNIQUE, MAKING USE OF HIGH THROUGHPUT TECHNOLOGIES AS DESCRIBED BY CMS-2020-01-R: HCPCS | Performed by: SURGERY

## 2020-09-27 LAB
SARS-COV-2 RNA SPEC QL NAA+PROBE: NOT DETECTED
SPECIMEN SOURCE: NORMAL

## 2020-09-28 ENCOUNTER — PRE VISIT (OUTPATIENT)
Dept: SURGERY | Facility: CLINIC | Age: 35
End: 2020-09-28

## 2020-10-01 ENCOUNTER — HOSPITAL ENCOUNTER (OUTPATIENT)
Facility: HOSPITAL | Age: 35
Discharge: HOME | End: 2020-10-01
Attending: SURGERY | Admitting: SURGERY
Payer: COMMERCIAL

## 2020-10-01 ENCOUNTER — ANESTHESIA (OUTPATIENT)
Dept: SURGERY | Facility: HOSPITAL | Age: 35
End: 2020-10-01
Payer: COMMERCIAL

## 2020-10-01 VITALS
OXYGEN SATURATION: 98 % | TEMPERATURE: 99 F | DIASTOLIC BLOOD PRESSURE: 76 MMHG | RESPIRATION RATE: 18 BRPM | HEART RATE: 70 BPM | SYSTOLIC BLOOD PRESSURE: 115 MMHG

## 2020-10-01 DIAGNOSIS — Z80.0 FAMILY HISTORY OF COLON CANCER: ICD-10-CM

## 2020-10-01 DIAGNOSIS — K60.30 ANAL FISTULA: ICD-10-CM

## 2020-10-01 PROCEDURE — 272N000001 HC OR GENERAL SUPPLY STERILE: Performed by: SURGERY

## 2020-10-01 PROCEDURE — 258N000003 HC RX IP 258 OP 636: Performed by: NURSE ANESTHETIST, CERTIFIED REGISTERED

## 2020-10-01 PROCEDURE — 88305 TISSUE EXAM BY PATHOLOGIST: CPT | Mod: TC | Performed by: SURGERY

## 2020-10-01 PROCEDURE — 45385 COLONOSCOPY W/LESION REMOVAL: CPT | Performed by: NURSE ANESTHETIST, CERTIFIED REGISTERED

## 2020-10-01 PROCEDURE — 250N000011 HC RX IP 250 OP 636: Performed by: NURSE ANESTHETIST, CERTIFIED REGISTERED

## 2020-10-01 PROCEDURE — 370N000001 HC ANESTHESIA TECHNICAL FEE, 1ST 30 MIN: Performed by: SURGERY

## 2020-10-01 PROCEDURE — 360N000014 HC SURGERY LEVEL 2 1ST 30 MIN: Performed by: SURGERY

## 2020-10-01 PROCEDURE — 45380 COLONOSCOPY AND BIOPSY: CPT | Mod: PT | Performed by: SURGERY

## 2020-10-01 PROCEDURE — 761N000007 HC RECOVERY PHASE 2 EACH 15 MINS: Performed by: SURGERY

## 2020-10-01 PROCEDURE — 999N000136 HC STATISTIC PRE PROC ASSESS II: Performed by: SURGERY

## 2020-10-01 PROCEDURE — 250N000009 HC RX 250: Performed by: NURSE ANESTHETIST, CERTIFIED REGISTERED

## 2020-10-01 RX ORDER — LIDOCAINE 40 MG/G
CREAM TOPICAL
Status: DISCONTINUED | OUTPATIENT
Start: 2020-10-01 | End: 2020-10-01 | Stop reason: HOSPADM

## 2020-10-01 RX ORDER — LIDOCAINE HYDROCHLORIDE 20 MG/ML
INJECTION, SOLUTION INFILTRATION; PERINEURAL PRN
Status: DISCONTINUED | OUTPATIENT
Start: 2020-10-01 | End: 2020-10-01

## 2020-10-01 RX ORDER — NALOXONE HYDROCHLORIDE 0.4 MG/ML
.1-.4 INJECTION, SOLUTION INTRAMUSCULAR; INTRAVENOUS; SUBCUTANEOUS
Status: DISCONTINUED | OUTPATIENT
Start: 2020-10-01 | End: 2020-10-01 | Stop reason: HOSPADM

## 2020-10-01 RX ORDER — FLUMAZENIL 0.1 MG/ML
0.2 INJECTION, SOLUTION INTRAVENOUS
Status: CANCELLED | OUTPATIENT
Start: 2020-10-01 | End: 2020-10-02

## 2020-10-01 RX ORDER — ONDANSETRON 2 MG/ML
4 INJECTION INTRAMUSCULAR; INTRAVENOUS EVERY 30 MIN PRN
Status: DISCONTINUED | OUTPATIENT
Start: 2020-10-01 | End: 2020-10-01 | Stop reason: HOSPADM

## 2020-10-01 RX ORDER — SODIUM CHLORIDE, SODIUM LACTATE, POTASSIUM CHLORIDE, CALCIUM CHLORIDE 600; 310; 30; 20 MG/100ML; MG/100ML; MG/100ML; MG/100ML
INJECTION, SOLUTION INTRAVENOUS CONTINUOUS
Status: DISCONTINUED | OUTPATIENT
Start: 2020-10-01 | End: 2020-10-01 | Stop reason: HOSPADM

## 2020-10-01 RX ORDER — PROPOFOL 10 MG/ML
INJECTION, EMULSION INTRAVENOUS PRN
Status: DISCONTINUED | OUTPATIENT
Start: 2020-10-01 | End: 2020-10-01

## 2020-10-01 RX ORDER — ONDANSETRON 4 MG/1
4 TABLET, ORALLY DISINTEGRATING ORAL EVERY 30 MIN PRN
Status: DISCONTINUED | OUTPATIENT
Start: 2020-10-01 | End: 2020-10-01 | Stop reason: HOSPADM

## 2020-10-01 RX ORDER — NALOXONE HYDROCHLORIDE 0.4 MG/ML
.1-.4 INJECTION, SOLUTION INTRAMUSCULAR; INTRAVENOUS; SUBCUTANEOUS
Status: CANCELLED | OUTPATIENT
Start: 2020-10-01 | End: 2020-10-02

## 2020-10-01 RX ORDER — MEPERIDINE HYDROCHLORIDE 25 MG/ML
12.5 INJECTION INTRAMUSCULAR; INTRAVENOUS; SUBCUTANEOUS
Status: DISCONTINUED | OUTPATIENT
Start: 2020-10-01 | End: 2020-10-01 | Stop reason: HOSPADM

## 2020-10-01 RX ADMIN — PROPOFOL 50 MG: 10 INJECTION, EMULSION INTRAVENOUS at 11:18

## 2020-10-01 RX ADMIN — SODIUM CHLORIDE, POTASSIUM CHLORIDE, SODIUM LACTATE AND CALCIUM CHLORIDE: 600; 310; 30; 20 INJECTION, SOLUTION INTRAVENOUS at 10:21

## 2020-10-01 RX ADMIN — PROPOFOL 20 MG: 10 INJECTION, EMULSION INTRAVENOUS at 11:23

## 2020-10-01 RX ADMIN — PROPOFOL 50 MG: 10 INJECTION, EMULSION INTRAVENOUS at 11:13

## 2020-10-01 RX ADMIN — PROPOFOL 50 MG: 10 INJECTION, EMULSION INTRAVENOUS at 11:11

## 2020-10-01 RX ADMIN — PROPOFOL 20 MG: 10 INJECTION, EMULSION INTRAVENOUS at 11:20

## 2020-10-01 RX ADMIN — PROPOFOL 50 MG: 10 INJECTION, EMULSION INTRAVENOUS at 11:15

## 2020-10-01 RX ADMIN — LIDOCAINE HYDROCHLORIDE 40 MG: 20 INJECTION, SOLUTION INFILTRATION; PERINEURAL at 11:11

## 2020-10-01 NOTE — OR NURSING
Patient and responsible adult given discharge instructions with no questions regarding instructions. Bacilio score 18/18. Pain level 0/10.  Discharged from unit via ambulatory. Patient discharged to home.

## 2020-10-01 NOTE — DISCHARGE INSTRUCTIONS

## 2020-10-01 NOTE — H&P
Surgery Consult Clinic Note      RE: Kaleb Junior  : 1985      Chief Complaint:  Colon cancer screening    History of Present Illness:  Dr. Mercer originally saw Mr. Junior on 2020 for exam under anesthesia, seton placement, and incision and drainage of perianal abscess.     He is here for evaluation regarding screening colonoscopy.  He underwent endoanal ultrasound which revealed a transsphincteric fistula with extension into the intersphincteric space with tracking in a horseshoe configuration.  He is here today for a colonoscopy to rule out other potential contributory pathology.  He reports a family history of colon cancer in his grandmother.  He denies changes in bowel habits, diarrhea, constipation, or blood in stools.  He has no questions regarding  bowel prep.  Reports passing yellow liquid stools today.  He specifically denies fevers, chills, nausea, vomiting, chest pain, shortness of breath, palpitations, sore throat, cough, or generalized feeling ill.      Medical history:  History reviewed. No pertinent past medical history.    Surgical history:  Past Surgical History:   Procedure Laterality Date     EXAM UNDER ANESTHESIA ANUS N/A 2020    Procedure: EXAM UNDER ANESTHESIA, ANUS, SETON PLACEMENT, INCISION AND DRAINAGE PERIANAL ABSCESS;  Surgeon: Marty Mercer MD;  Location: [facility]     HAND SURGERY Right 2006     ORTHOPEDIC SURGERY      tendon repair right hand       Family history:  Family History   Problem Relation Age of Onset     Diabetes Paternal Grandmother        Medications:  Prior to Admission medications    Medication Sig Start Date End Date Taking? Authorizing Provider   Psyllium (METAMUCIL) 48.57 % POWD    Yes Reported, Patient   triamcinolone (KENALOG) 0.1 % external cream Apply topically 2 times daily  Patient not taking: Reported on 2020   Katerina Soni NP     Allergies:  The patient has No Known Allergies.  .  Social history:  Social History      Tobacco Use     Smoking status: Former Smoker     Smokeless tobacco: Former User     Types: Chew     Quit date: 9/2/2018   Substance Use Topics     Alcohol use: Yes     Comment: occasionaly      Marital status: single.      Review of Systems:    Constitutional: Negative for fever, chills and weight loss.   HENT: Negative for ear pain, nosebleeds, congestion, sore throat, tinnitus and ear discharge.    Eyes: Negative for blurred vision, double vision, photophobia and pain.   Respiratory: Negative for cough, hemoptysis, shortness of breath, wheezing and stridor.    Cardiovascular: Negative for chest pain, palpitations and orthopnea.   Gastrointestinal: Negative for heartburn, nausea, vomiting, abdominal pain and blood in stool.   Genitourinary: Negative for urgency, frequency and hematuria.   Musculoskeletal: Negative for myalgias, back pain and joint pain.   Neurological: Negative for tingling, speech change and headaches.   Endo/Heme/Allergies: Does not bruise/bleed easily.   Psychiatric/Behavioral: Negative for depression, suicidal ideas and hallucinations. The patient is not nervous/anxious.    Physical Examination:  /93   Pulse 69   Temp 99  F (37.2  C) (Oral)   SpO2 97%   General: Alert and orientedx4, no acute distress, well-developed/well-nourished, ambulating without assistance  HEENT: normocephalic atraumatic, extraocular movements intact, PERRL Sclerae anicteric; Trachea mideline, no JVD  Chest:   Clear to auscultation bilaterally.  Cardiac: S1S2 , regular rate and rhythm without additional sounds  Abdomen: Soft, non-tender, non-distended  Extremities: Cursory exam unremarkable.  No peripheral edema noted.  Skin: Warm, dry, < 2 sec cap refill  Neuro: CN 2-12 grossly intact, no focal deficit, GCS 15  Psych: Pleasant, calm, asks appropriate questions      Assessment/Plan:  #1 Colonoscopy  #2 Transsphincteric fistula with seton placement      Kaleb Junoir and I had a discussion about  colonoscopies.  The indications, risks, benefits, althernatives and technical aspects of whole colon colonoscopy were outlined with risks including, but not limited to, perforation, bleeding and inability to visualize entire colon.  Management of each was reviewed including the risk for life saving surgery and possible admittance to the hospital.  His questions were asked and answered.  We will proceed with exam as scheduled.  Adri Rincon [facility]  [address] [place] [address]    [id number]    Referring Provider:  No referring provider defined for this encounter.     Primary Care Provider:  Tomasa Colin

## 2020-10-01 NOTE — ANESTHESIA CARE TRANSFER NOTE
Patient: Kaleb Junior    Procedure(s):  colonoscopy with polypectomy    Diagnosis: Family history of colon cancer [Z80.0]  Anal fistula [K60.3]  Diagnosis Additional Information: No value filed.    Anesthesia Type:   MAC     Note:  Airway :Nasal Cannula  Patient transferred to:Phase II  Handoff Report: Identifed the Patient, Identified the Reponsible Provider, Reviewed the pertinent medical history, Discussed the surgical course, Reviewed Intra-OP anesthesia mangement and issues during anesthesia, Set expectations for post-procedure period and Allowed opportunity for questions and acknowledgement of understanding      Vitals: (Last set prior to Anesthesia Care Transfer)    CRNA VITALS  10/1/2020 1056 - 10/1/2020 1128      10/1/2020             Resp Rate (set):  8                Electronically Signed By: QIAN Fenton CRNA  October 1, 2020  11:28 AM

## 2020-10-01 NOTE — ANESTHESIA POSTPROCEDURE EVALUATION
Patient: Kaleb Junior    Procedure(s):  colonoscopy with polypectomy    Diagnosis:Family history of colon cancer [Z80.0]  Anal fistula [K60.3]  Diagnosis Additional Information: No value filed.    Anesthesia Type:  MAC    Note:  Anesthesia Post Evaluation    Patient location during evaluation: Bedside  Patient participation: Able to fully participate in evaluation  Level of consciousness: awake  Pain management: adequate  Airway patency: patent  Cardiovascular status: acceptable  Respiratory status: acceptable and room air  Hydration status: acceptable  PONV: none     Anesthetic complications: None          Last vitals:  Vitals:    10/01/20 1135 10/01/20 1140 10/01/20 1145   BP: 101/74 104/75 99/72   Pulse: 63 81 77   Resp: 16 16 16   Temp:      SpO2: 97% 97% 94%         Electronically Signed By: QIAN Fenton CRNA  October 1, 2020  11:53 AM

## 2020-10-01 NOTE — OP NOTE
Kaleb Junior MRN# [id number]   YOB: 1985 Age: 35 year old      Date of Admission:  10/1/2020  Date of Service:   10/01/20    Primary care provider: Tomasa Colin    PREOPERATIVE DIAGNOSIS:  Colon Cancer screening, family history of colon cancer       POSTOPERATIVE DIAGNOSIS:  Colon polyp x 1          PROCEDURE:  Colonoscopy with polypectomy          INDICATIONS:  Screening colonoscopy.      Specimen:   ID Type Source Tests Collected by Time Destination   A :  Polyp Large Intestine, Left/Descending SURGICAL PATHOLOGY EXAM Marty Mercer MD 10/1/2020 11:22 AM        SURGEON: Marty Mercer MD    DESCRIPTION OF PROCEDURE: Kaleb Junior was brought into the endoscopy suite and placed in the left lateral decubitus position. After preprocedural pause and attended monitored anesthesia was administered, the external anus was inspected and was noted to have a red vessel loop secured from known perianal fistula. Digital rectal exam was normal. The colonoscope was inserted and advanced under direct visualization to the level of the cecum which was identified by the appendiceal orifice and the ileocecal valve. The prep was excellent.. Upon slow withdrawal of the colonoscope, approximately 95% of the mucosa was directly visualized. There was one small polyp in the descending colon removed with biopsy forceps.  The rest of the colon was without mucosal abnormality. There was no evidence of further polyps, inflammation, bleeding or AVMs. Retroflexion of the rectum was normal. The extra air was removed from the colon, and the colonoscope withdrawn. The patient tolerated the procedure well and was taken to postanesthesia care unit.     We invite the patient to return in 5-10 years for follow up screening evaluation, pending pathology related to polyp removed.    Marty Mercer MD

## 2020-10-02 LAB — COPATH REPORT: NORMAL

## 2020-10-21 ENCOUNTER — PATIENT OUTREACH (OUTPATIENT)
Dept: SURGERY | Facility: CLINIC | Age: 35
End: 2020-10-21

## 2020-10-26 NOTE — PROGRESS NOTES
Colon and Rectal Surgery Clinic Note    RE: Kaleb Junior.  : 1985.  LIZETH: 10/28/2020.    Reason for visit: Complex TS Fistula    HPI: Kaleb Junior is a very pleasant 35 year old male without a significant past medical history with a recent diagnosis of a complex perirectal fistula.   - On 20, he underwent I&D of abscess with seton placement. He underwent endoanal U/S which revealed a transsphincteric fistula with extension into intersphincteric space and tracking in a horseshoe configuration. Seton in place since then,  -He underwent colonoscopy on 10/1, which was normal.      He reports overall he is doing well. Draining some pus/bloody mixture. Otherwise no fevers or chills.     MRI on 10/27:   Impression:   1. Left intersphincteric fistula with internal opening at the level of  levator with horseshoeing in posterior intersphincteric space. Seton  in infrasphincteric distal perineal fistula.  2. No associated peritoneal abscess. Prior intersphincteric abscess  seen on CT is resolved following seton placement, with granulation  tissue remaining.  3. Multiple enlarged perirectal lymph nodes likely reactive       Medical history:  None    Surgical history:  Past Surgical History:   Procedure Laterality Date     COLONOSCOPY N/A 10/1/2020    Procedure: colonoscopy with polypectomy;  Surgeon: Marty Mercer MD;  Location: [facility]     EXAM UNDER ANESTHESIA ANUS N/A 2020    Procedure: EXAM UNDER ANESTHESIA, ANUS, SETON PLACEMENT, INCISION AND DRAINAGE PERIANAL ABSCESS;  Surgeon: Marty Mercer MD;  Location: [facility]     HAND SURGERY Right 2006     ORTHOPEDIC SURGERY      tendon repair right hand       Family history:  Family History   Problem Relation Age of Onset     Diabetes Paternal Grandmother      Rectal cancer in grandmother in 70s, no IBD    Medications:  Current Outpatient Medications   Medication Sig Dispense Refill     Psyllium (METAMUCIL) 48.57 % POWD        triamcinolone (KENALOG) 0.1 %  "external cream Apply topically 2 times daily (Patient not taking: Reported on 8/12/2020) 80 g 1       Allergies:  No Known Allergies    Social history:   Social History     Tobacco Use     Smoking status: Former Smoker     Smokeless tobacco: Former User     Types: Chew     Quit date: 9/2/2018   Substance Use Topics     Alcohol use: Yes     Comment: occasionaly      Marital status: single.    ROS:  A complete review of systems was performed with the patient and all systems negative except as per HPI.    Physical Examination:  Exam was chaperoned by Jessica Sosa  /79 (BP Location: Left arm, Patient Position: Sitting, Cuff Size: Adult Regular)   Pulse 89   Temp 98.7  F (37.1  C) (Oral)   Ht 6' 1\"   Wt 215 lb 14.4 oz   SpO2 96%   BMI 28.48 kg/m    General: Well hydrated. No acute distress.  Abdomen: Soft, NT, ND. No inguinal adenopathy palpated.  Perianal external examination:  Perianal skin: intact.  Lesions: No. Seton in place left lateral 2-3 cm distal to anal verge.  Eversion of buttocks: There was not evidence of an anal fissure. Details: N/A.  Skin tags or external hemorrhoids: No.  Digital rectal examination: Was deferred due to pain at fistula site    Anoscopy: Was deferred in order not to cause further trauma    Procedures:  non.    Laboratory values reviewed:  Recent Labs   Lab Test 07/27/20  1220   WBC 13.5*   HGB 14.2      CR 0.79   ALBUMIN 3.8   BILITOTAL 0.6   ALKPHOS 94   ALT 26   AST 12       Imaging personally reviewed by me:  MRI 10/27    ASSESSMENT  Assessment/Plan:   1. 35 year old male with a recent diagnosis perianal abscess complicated by complex perirectal fistula with extension to levators, with seton in place through intersphincteric component.    PLAN  1. EUA, evaluate internal tract further, seton replacement, possible need for further debridement.  2. He suggested a second opinion at [place]. I encouraged pursuing a second opinion if that is within their wishes. "     Risks, benefits, and alternatives of operative treatment were thoroughly discussed with the patient, he/she understands these well and agrees to proceed.    Time spent: 30 minutes.  >50% spent in discussing, counseling and coordinating care.    Kevin Nassar M.D    Division of Colon and Rectal Surgery  [facility]    Referring Provider:  No referring provider defined for this encounter.     Primary Care Provider:  Tomasa Colin

## 2020-10-27 ENCOUNTER — ANCILLARY PROCEDURE (OUTPATIENT)
Dept: MRI IMAGING | Facility: CLINIC | Age: 35
End: 2020-10-27
Attending: SURGERY
Payer: COMMERCIAL

## 2020-10-27 DIAGNOSIS — K60.30 ANAL FISTULA: ICD-10-CM

## 2020-10-27 PROCEDURE — A9585 GADOBUTROL INJECTION: HCPCS | Performed by: RADIOLOGY

## 2020-10-27 PROCEDURE — 72197 MRI PELVIS W/O & W/DYE: CPT | Performed by: RADIOLOGY

## 2020-10-27 RX ORDER — GADOBUTROL 604.72 MG/ML
10 INJECTION INTRAVENOUS ONCE
Status: COMPLETED | OUTPATIENT
Start: 2020-10-27 | End: 2020-10-27

## 2020-10-27 RX ADMIN — GADOBUTROL 10 ML: 604.72 INJECTION INTRAVENOUS at 15:57

## 2020-10-28 ENCOUNTER — OFFICE VISIT (OUTPATIENT)
Dept: SURGERY | Facility: CLINIC | Age: 35
End: 2020-10-28
Payer: COMMERCIAL

## 2020-10-28 ENCOUNTER — TELEPHONE (OUTPATIENT)
Dept: SURGERY | Facility: CLINIC | Age: 35
End: 2020-10-28

## 2020-10-28 VITALS
OXYGEN SATURATION: 96 % | HEART RATE: 89 BPM | DIASTOLIC BLOOD PRESSURE: 79 MMHG | HEIGHT: 73 IN | WEIGHT: 215.9 LBS | TEMPERATURE: 98.7 F | BODY MASS INDEX: 28.61 KG/M2 | SYSTOLIC BLOOD PRESSURE: 119 MMHG

## 2020-10-28 DIAGNOSIS — K60.30 ANAL FISTULA: Primary | ICD-10-CM

## 2020-10-28 DIAGNOSIS — Z11.59 ENCOUNTER FOR SCREENING FOR OTHER VIRAL DISEASES: Primary | ICD-10-CM

## 2020-10-28 PROCEDURE — 99203 OFFICE O/P NEW LOW 30 MIN: CPT | Performed by: SURGERY

## 2020-10-28 ASSESSMENT — MIFFLIN-ST. JEOR: SCORE: 1963.2

## 2020-10-28 ASSESSMENT — PAIN SCALES - GENERAL: PAINLEVEL: MODERATE PAIN (5)

## 2020-10-28 NOTE — NURSING NOTE
"Chief Complaint   Patient presents with     RECHECK     Follow up to discuss YOBANI results.       Vitals:    10/28/20 0844   BP: 119/79   BP Location: Left arm   Patient Position: Sitting   Cuff Size: Adult Regular   Pulse: 89   Temp: 98.7  F (37.1  C)   TempSrc: Oral   SpO2: 96%   Weight: 215 lb 14.4 oz   Height: 6' 1\"       Body mass index is 28.48 kg/m .            Jessica Sosa CMA    "

## 2020-10-28 NOTE — TELEPHONE ENCOUNTER
Patient is scheduled for surgery with Dr. Nassar    Spoke with Kaleb    Date of Surgery: 11/5/2020    Location: [facility]    Informed patient they will need an adult  Yes    Pre-op with surgeon (if applicable): 10/28/2020    COVID-19 Test: 11/2/2020 at 10:50 am at [place], [place]     H&P: Scheduled with PAC Video Visit on 11/2/2020 at 10:50 am    Post-operative appointment: 11/16/2020 at 2:30 pm - Video Visit    Surgery packet: MyChart and Mail    Additional comments:   - patient stated that they are seeking a 2nd opinion at [facility] in [place], so case may end up being cancelled

## 2020-10-28 NOTE — LETTER
10/28/2020       RE: Kaleb Junior  [address]  [facility] [address]     Dear Colleague,    Thank you for referring your patient, Kaleb Junior, to the [facility] [place] at [facility]. Please see a copy of my visit note below.    Colon and Rectal Surgery Clinic Note    RE: Kaleb Junior.  : 1985.  LIZETH: 10/28/2020.    Reason for visit: Complex TS Fistula    HPI: Kaleb Junior is a very pleasant 35 year old male without a significant past medical history with a recent diagnosis of a complex perirectal fistula.   - On 20, he underwent I&D of abscess with seton placement. He underwent endoanal U/S which revealed a transsphincteric fistula with extension into intersphincteric space and tracking in a horseshoe configuration. Seton in place since then,  -He underwent colonoscopy on 10/1, which was normal.      He reports overall he is doing well. Draining some pus/bloody mixture. Otherwise no fevers or chills.     MRI on 10/27:   Impression:   1. Left intersphincteric fistula with internal opening at the level of  levator with horseshoeing in posterior intersphincteric space. Seton  in infrasphincteric distal perineal fistula.  2. No associated peritoneal abscess. Prior intersphincteric abscess  seen on CT is resolved following seton placement, with granulation  tissue remaining.  3. Multiple enlarged perirectal lymph nodes likely reactive       Medical history:  None    Surgical history:  Past Surgical History:   Procedure Laterality Date     COLONOSCOPY N/A 10/1/2020    Procedure: colonoscopy with polypectomy;  Surgeon: Marty Mercer MD;  Location: [facility]     EXAM UNDER ANESTHESIA ANUS N/A 2020    Procedure: EXAM UNDER ANESTHESIA, ANUS, SETON PLACEMENT, INCISION AND DRAINAGE PERIANAL ABSCESS;  Surgeon: Marty Mercer MD;  Location: [facility]     HAND SURGERY Right 2006     ORTHOPEDIC SURGERY      tendon repair right hand  "      Family history:  Family History   Problem Relation Age of Onset     Diabetes Paternal Grandmother      Rectal cancer in grandmother in 70s, no IBD    Medications:  Current Outpatient Medications   Medication Sig Dispense Refill     Psyllium (METAMUCIL) 48.57 % POWD        triamcinolone (KENALOG) 0.1 % external cream Apply topically 2 times daily (Patient not taking: Reported on 8/12/2020) 80 g 1       Allergies:  No Known Allergies    Social history:   Social History     Tobacco Use     Smoking status: Former Smoker     Smokeless tobacco: Former User     Types: Chew     Quit date: 9/2/2018   Substance Use Topics     Alcohol use: Yes     Comment: occasionaly      Marital status: single.    ROS:  A complete review of systems was performed with the patient and all systems negative except as per HPI.    Physical Examination:  Exam was chaperoned by Jessica Sosa  /79 (BP Location: Left arm, Patient Position: Sitting, Cuff Size: Adult Regular)   Pulse 89   Temp 98.7  F (37.1  C) (Oral)   Ht 6' 1\"   Wt 215 lb 14.4 oz   SpO2 96%   BMI 28.48 kg/m    General: Well hydrated. No acute distress.  Abdomen: Soft, NT, ND. No inguinal adenopathy palpated.  Perianal external examination:  Perianal skin: intact.  Lesions: No. Seton in place left lateral 2-3 cm distal to anal verge.  Eversion of buttocks: There was not evidence of an anal fissure. Details: N/A.  Skin tags or external hemorrhoids: No.  Digital rectal examination: Was deferred due to pain at fistula site    Anoscopy: Was deferred in order not to cause further trauma    Procedures:  non.    Laboratory values reviewed:  Recent Labs   Lab Test 07/27/20  1220   WBC 13.5*   HGB 14.2      CR 0.79   ALBUMIN 3.8   BILITOTAL 0.6   ALKPHOS 94   ALT 26   AST 12       Imaging personally reviewed by me:  MRI 10/27    ASSESSMENT  Assessment/Plan:   1. 35 year old male with a recent diagnosis perianal abscess complicated by complex perirectal " fistula with extension to levators, with seton in place through intersphincteric component.    PLAN  1. EUA, evaluate internal tract further, seton replacement, possible need for further debridement.  2. He suggested a second opinion at [place]. I encouraged pursuing a second opinion if that is within their wishes.     Risks, benefits, and alternatives of operative treatment were thoroughly discussed with the patient, he/she understands these well and agrees to proceed.    Time spent: 30 minutes.  >50% spent in discussing, counseling and coordinating care.    Kevin Nassar M.D    Division of Colon and Rectal Surgery  [facility]    Referring Provider:  No referring provider defined for this encounter.     Primary Care Provider:  Tomasa Colin

## 2020-10-29 NOTE — TELEPHONE ENCOUNTER
FUTURE VISIT INFORMATION      SURGERY INFORMATION:    Date: 20    [facility]    Surgeon:  Kevin Nassar MD    Anesthesia Type:  Combined General with Block    Procedure: EXAM UNDER ANESTHESIA, RECTUM, seton replacement    Consult: ov 10/28/20    RECORDS REQUESTED FROM:       [facility]    Most recent EKG+ Tracin18    Most recent PFT's: 18

## 2020-10-30 ENCOUNTER — TRANSFERRED RECORDS (OUTPATIENT)
Dept: HEALTH INFORMATION MANAGEMENT | Facility: CLINIC | Age: 35
End: 2020-10-30

## 2020-11-01 ENCOUNTER — OFFICE VISIT (OUTPATIENT)
Dept: FAMILY MEDICINE | Facility: OTHER | Age: 35
End: 2020-11-01
Attending: FAMILY MEDICINE
Payer: COMMERCIAL

## 2020-11-01 DIAGNOSIS — Z11.59 ENCOUNTER FOR SCREENING FOR OTHER VIRAL DISEASES: ICD-10-CM

## 2020-11-01 PROCEDURE — U0003 INFECTIOUS AGENT DETECTION BY NUCLEIC ACID (DNA OR RNA); SEVERE ACUTE RESPIRATORY SYNDROME CORONAVIRUS 2 (SARS-COV-2) (CORONAVIRUS DISEASE [COVID-19]), AMPLIFIED PROBE TECHNIQUE, MAKING USE OF HIGH THROUGHPUT TECHNOLOGIES AS DESCRIBED BY CMS-2020-01-R: HCPCS | Performed by: SURGERY

## 2020-11-01 RX ORDER — ACETAMINOPHEN 325 MG/1
975 TABLET ORAL ONCE
Status: CANCELLED | OUTPATIENT
Start: 2020-11-01 | End: 2020-11-01

## 2020-11-02 ENCOUNTER — TRANSFERRED RECORDS (OUTPATIENT)
Dept: HEALTH INFORMATION MANAGEMENT | Facility: CLINIC | Age: 35
End: 2020-11-02

## 2020-11-02 LAB
SARS-COV-2 RNA SPEC QL NAA+PROBE: NOT DETECTED
SPECIMEN SOURCE: NORMAL

## 2020-11-03 ENCOUNTER — TELEPHONE (OUTPATIENT)
Dept: SURGERY | Facility: CLINIC | Age: 35
End: 2020-11-03

## 2020-11-03 ENCOUNTER — PRE VISIT (OUTPATIENT)
Dept: SURGERY | Facility: CLINIC | Age: 35
End: 2020-11-03

## 2020-11-03 NOTE — TELEPHONE ENCOUNTER
Patient called to cancel surgery on 11/5/2020 scheduled with Dr. Nassar.    Patient is likely going to [place] for his surgery instead. If not, he will call back to reschedule next week.    Surgery and related appointments cancelled.

## 2020-11-05 ENCOUNTER — HOSPITAL ENCOUNTER (OUTPATIENT)
Facility: AMBULATORY SURGERY CENTER | Age: 35
End: 2020-11-05
Attending: SURGERY
Payer: COMMERCIAL

## 2020-11-25 ENCOUNTER — TRANSFERRED RECORDS (OUTPATIENT)
Dept: HEALTH INFORMATION MANAGEMENT | Facility: CLINIC | Age: 35
End: 2020-11-25

## 2020-11-27 ENCOUNTER — ALLIED HEALTH/NURSE VISIT (OUTPATIENT)
Dept: FAMILY MEDICINE | Facility: OTHER | Age: 35
End: 2020-11-27
Attending: FAMILY MEDICINE
Payer: COMMERCIAL

## 2020-11-27 DIAGNOSIS — Z23 NEED FOR PROPHYLACTIC VACCINATION AND INOCULATION AGAINST INFLUENZA: Primary | ICD-10-CM

## 2020-11-27 PROCEDURE — 90471 IMMUNIZATION ADMIN: CPT

## 2020-11-27 PROCEDURE — 90686 IIV4 VACC NO PRSV 0.5 ML IM: CPT

## 2021-04-18 ENCOUNTER — HEALTH MAINTENANCE LETTER (OUTPATIENT)
Age: 36
End: 2021-04-18

## 2021-08-05 ENCOUNTER — MEDICAL CORRESPONDENCE (OUTPATIENT)
Dept: MRI IMAGING | Facility: HOSPITAL | Age: 36
End: 2021-08-05

## 2021-08-09 ENCOUNTER — HOSPITAL ENCOUNTER (OUTPATIENT)
Dept: MRI IMAGING | Facility: HOSPITAL | Age: 36
Discharge: HOME | End: 2021-08-09
Attending: PODIATRIST | Admitting: PODIATRIST
Payer: COMMERCIAL

## 2021-08-09 DIAGNOSIS — S86.092A OTHER SPECIFIED INJURY OF LEFT ACHILLES TENDON, INITIAL ENCOUNTER: ICD-10-CM

## 2021-08-09 DIAGNOSIS — M76.62 TENDONITIS, ACHILLES, LEFT: ICD-10-CM

## 2021-08-09 PROCEDURE — 73721 MRI JNT OF LWR EXTRE W/O DYE: CPT | Mod: LT

## 2021-10-03 ENCOUNTER — HEALTH MAINTENANCE LETTER (OUTPATIENT)
Age: 36
End: 2021-10-03

## 2021-11-04 ENCOUNTER — ALLIED HEALTH/NURSE VISIT (OUTPATIENT)
Dept: FAMILY MEDICINE | Facility: OTHER | Age: 36
End: 2021-11-04
Attending: FAMILY MEDICINE
Payer: COMMERCIAL

## 2021-11-04 DIAGNOSIS — Z23 NEED FOR PROPHYLACTIC VACCINATION AND INOCULATION AGAINST INFLUENZA: Primary | ICD-10-CM

## 2021-11-04 PROCEDURE — 90686 IIV4 VACC NO PRSV 0.5 ML IM: CPT

## 2021-11-04 PROCEDURE — 90471 IMMUNIZATION ADMIN: CPT

## 2022-05-14 ENCOUNTER — HEALTH MAINTENANCE LETTER (OUTPATIENT)
Age: 37
End: 2022-05-14

## 2022-09-04 ENCOUNTER — HEALTH MAINTENANCE LETTER (OUTPATIENT)
Age: 37
End: 2022-09-04

## 2023-06-03 ENCOUNTER — HEALTH MAINTENANCE LETTER (OUTPATIENT)
Age: 38
End: 2023-06-03

## 2023-10-17 NOTE — PROGRESS NOTES
"  Assessment & Plan     1. Subacute cough  Possible allergies.  Will start prednisone burst to calm airway inflammation, and will start Claritin.  With fatigue, will check labs as listed.  Follow-up with results when available.  - CBC with platelets; Future  - Basic metabolic panel; Future  - TSH with free T4 reflex; Future  - Ferritin; Future  - predniSONE (DELTASONE) 20 MG tablet; Take 1 tablet (20 mg) by mouth daily for 5 days  Dispense: 5 tablet; Refill: 0  - loratadine (CLARITIN) 10 MG tablet; Take 1 tablet (10 mg) by mouth daily  Dispense: 30 tablet; Refill: 2  - CBC with platelets  - Basic metabolic panel  - TSH with free T4 reflex  - Ferritin    2. Fatigue, unspecified type  - CBC with platelets; Future  - Basic metabolic panel; Future  - TSH with free T4 reflex; Future  - Ferritin; Future  - CBC with platelets  - Basic metabolic panel  - TSH with free T4 reflex  - Ferritin        MED REC REQUIRED  Post Medication Reconciliation Status: patient was not discharged from an inpatient facility or TCU    BMI:   Estimated body mass index is 29.27 kg/m  as calculated from the following:    Height as of this encounter: 1.867 m (6' 1.5\").    Weight as of this encounter: 102 kg (224 lb 14.4 oz).     Return if symptoms worsen or fail to improve.    Tomasa Colin MD  [employer] LUIZ Manuel is a 38 year old, presenting for the following health issues:  ER F/U      HPI     ED/UC Followup:    Facility:  [facility]   Date of visit: 10/15/23   Reason for visit: persistent cough , heart burn   Current Status: still cough, heartburn has gotten better, pt states he is really fatigued all the time, seems that cough has gotten more productive         Review of Systems   Constitutional, HEENT, cardiovascular, pulmonary, gi and gu systems are negative, except as otherwise noted.      Objective    /82 (BP Location: Right arm, Patient Position: Sitting, Cuff Size: Adult Regular)   " "Pulse 91   Temp 98.1  F (36.7  C) (Tympanic)   Resp 16   Ht 1.867 m (6' 1.5\")   Wt 102 kg (224 lb 14.4 oz)   SpO2 94%   BMI 29.27 kg/m    Body mass index is 29.27 kg/m .  Physical Exam   GENERAL: healthy, alert and no distress  RESP: lungs clear to auscultation - no rales, rhonchi or wheezes  CV: regular rates and rhythm, normal S1 S2, no S3 or S4, and no murmur, click or rub  PSYCH: mentation appears normal, affect normal/bright                  Answers submitted by the patient for this visit:  Patient Health Questionnaire (Submitted on 10/19/2023)  If you checked off any problems, how difficult have these problems made it for you to do your work, take care of things at home, or get along with other people?: Not difficult at all  PHQ9 TOTAL SCORE: 2  VANIA-7 (Submitted on 10/19/2023)  VANIA 7 TOTAL SCORE: 0    "

## 2023-10-19 ENCOUNTER — OFFICE VISIT (OUTPATIENT)
Dept: FAMILY MEDICINE | Facility: OTHER | Age: 38
End: 2023-10-19
Attending: FAMILY MEDICINE
Payer: COMMERCIAL

## 2023-10-19 VITALS
TEMPERATURE: 98.1 F | RESPIRATION RATE: 16 BRPM | OXYGEN SATURATION: 94 % | DIASTOLIC BLOOD PRESSURE: 82 MMHG | HEART RATE: 91 BPM | HEIGHT: 74 IN | SYSTOLIC BLOOD PRESSURE: 132 MMHG | WEIGHT: 224.9 LBS | BODY MASS INDEX: 28.86 KG/M2

## 2023-10-19 DIAGNOSIS — R05.2 SUBACUTE COUGH: Primary | ICD-10-CM

## 2023-10-19 DIAGNOSIS — R53.83 FATIGUE, UNSPECIFIED TYPE: ICD-10-CM

## 2023-10-19 DIAGNOSIS — R05.2 SUBACUTE COUGH: ICD-10-CM

## 2023-10-19 LAB
ANION GAP SERPL CALCULATED.3IONS-SCNC: 11 MMOL/L (ref 7–15)
BUN SERPL-MCNC: 13.7 MG/DL (ref 6–20)
CALCIUM SERPL-MCNC: 9.7 MG/DL (ref 8.6–10)
CHLORIDE SERPL-SCNC: 103 MMOL/L (ref 98–107)
CREAT SERPL-MCNC: 0.94 MG/DL (ref 0.67–1.17)
DEPRECATED HCO3 PLAS-SCNC: 26 MMOL/L (ref 22–29)
EGFRCR SERPLBLD CKD-EPI 2021: >90 ML/MIN/1.73M2
ERYTHROCYTE [DISTWIDTH] IN BLOOD BY AUTOMATED COUNT: 12.3 % (ref 10–15)
FERRITIN SERPL-MCNC: 448 NG/ML (ref 31–409)
GLUCOSE SERPL-MCNC: 99 MG/DL (ref 70–99)
HCT VFR BLD AUTO: 47.2 % (ref 40–53)
HGB BLD-MCNC: 15.7 G/DL (ref 13.3–17.7)
MCH RBC QN AUTO: 29.4 PG (ref 26.5–33)
MCHC RBC AUTO-ENTMCNC: 33.3 G/DL (ref 31.5–36.5)
MCV RBC AUTO: 88 FL (ref 78–100)
PLATELET # BLD AUTO: 246 10E3/UL (ref 150–450)
POTASSIUM SERPL-SCNC: 4.9 MMOL/L (ref 3.4–5.3)
RBC # BLD AUTO: 5.34 10E6/UL (ref 4.4–5.9)
SODIUM SERPL-SCNC: 140 MMOL/L (ref 135–145)
TSH SERPL DL<=0.005 MIU/L-ACNC: 1.73 UIU/ML (ref 0.3–4.2)
WBC # BLD AUTO: 8.9 10E3/UL (ref 4–11)

## 2023-10-19 PROCEDURE — 36415 COLL VENOUS BLD VENIPUNCTURE: CPT | Performed by: FAMILY MEDICINE

## 2023-10-19 PROCEDURE — 84443 ASSAY THYROID STIM HORMONE: CPT | Performed by: FAMILY MEDICINE

## 2023-10-19 PROCEDURE — 85027 COMPLETE CBC AUTOMATED: CPT | Performed by: FAMILY MEDICINE

## 2023-10-19 PROCEDURE — 82728 ASSAY OF FERRITIN: CPT | Performed by: FAMILY MEDICINE

## 2023-10-19 PROCEDURE — 80048 BASIC METABOLIC PNL TOTAL CA: CPT | Performed by: FAMILY MEDICINE

## 2023-10-19 PROCEDURE — 99214 OFFICE O/P EST MOD 30 MIN: CPT | Performed by: FAMILY MEDICINE

## 2023-10-19 RX ORDER — OMEPRAZOLE 40 MG/1
CAPSULE, DELAYED RELEASE ORAL
COMMUNITY
End: 2024-03-18

## 2023-10-19 RX ORDER — PREDNISONE 20 MG/1
20 TABLET ORAL DAILY
Qty: 5 TABLET | Refills: 0 | Status: SHIPPED | OUTPATIENT
Start: 2023-10-19 | End: 2023-10-24

## 2023-10-19 RX ORDER — LORATADINE 10 MG/1
10 TABLET ORAL DAILY
Qty: 30 TABLET | Refills: 2 | Status: SHIPPED | OUTPATIENT
Start: 2023-10-19 | End: 2024-03-18

## 2023-10-19 ASSESSMENT — ANXIETY QUESTIONNAIRES
5. BEING SO RESTLESS THAT IT IS HARD TO SIT STILL: NOT AT ALL
2. NOT BEING ABLE TO STOP OR CONTROL WORRYING: NOT AT ALL
3. WORRYING TOO MUCH ABOUT DIFFERENT THINGS: NOT AT ALL
4. TROUBLE RELAXING: NOT AT ALL
IF YOU CHECKED OFF ANY PROBLEMS ON THIS QUESTIONNAIRE, HOW DIFFICULT HAVE THESE PROBLEMS MADE IT FOR YOU TO DO YOUR WORK, TAKE CARE OF THINGS AT HOME, OR GET ALONG WITH OTHER PEOPLE: NOT DIFFICULT AT ALL
6. BECOMING EASILY ANNOYED OR IRRITABLE: NOT AT ALL
7. FEELING AFRAID AS IF SOMETHING AWFUL MIGHT HAPPEN: NOT AT ALL
GAD7 TOTAL SCORE: 0
1. FEELING NERVOUS, ANXIOUS, OR ON EDGE: NOT AT ALL
GAD7 TOTAL SCORE: 0

## 2023-10-19 ASSESSMENT — PATIENT HEALTH QUESTIONNAIRE - PHQ9
SUM OF ALL RESPONSES TO PHQ QUESTIONS 1-9: 2
10. IF YOU CHECKED OFF ANY PROBLEMS, HOW DIFFICULT HAVE THESE PROBLEMS MADE IT FOR YOU TO DO YOUR WORK, TAKE CARE OF THINGS AT HOME, OR GET ALONG WITH OTHER PEOPLE: NOT DIFFICULT AT ALL
SUM OF ALL RESPONSES TO PHQ QUESTIONS 1-9: 2

## 2023-10-19 ASSESSMENT — PAIN SCALES - GENERAL: PAINLEVEL: NO PAIN (0)

## 2023-10-20 ENCOUNTER — MYC MEDICAL ADVICE (OUTPATIENT)
Dept: FAMILY MEDICINE | Facility: OTHER | Age: 38
End: 2023-10-20

## 2023-10-20 DIAGNOSIS — R79.89 ELEVATED FERRITIN: Primary | ICD-10-CM

## 2023-10-20 RX ORDER — LORATADINE 10 MG/1
10 TABLET ORAL DAILY
Qty: 90 TABLET | OUTPATIENT
Start: 2023-10-20

## 2023-11-08 ENCOUNTER — LAB (OUTPATIENT)
Dept: LAB | Facility: OTHER | Age: 38
End: 2023-11-08
Payer: COMMERCIAL

## 2023-11-08 DIAGNOSIS — R79.89 ELEVATED FERRITIN: ICD-10-CM

## 2023-11-08 LAB — FERRITIN SERPL-MCNC: 402 NG/ML (ref 31–409)

## 2023-11-08 PROCEDURE — 36415 COLL VENOUS BLD VENIPUNCTURE: CPT

## 2023-11-08 PROCEDURE — 82728 ASSAY OF FERRITIN: CPT

## 2023-11-12 ENCOUNTER — MYC MEDICAL ADVICE (OUTPATIENT)
Dept: FAMILY MEDICINE | Facility: OTHER | Age: 38
End: 2023-11-12

## 2023-11-13 NOTE — TELEPHONE ENCOUNTER
Probably a little costochondritis after coughing.  Regular NSAIDs for about a week or so, then as needed

## 2023-11-13 NOTE — TELEPHONE ENCOUNTER
11/13/2023 3:08 PM    Pt updated on note below. Pt verbalizes understanding and agrees to plan.    Advised to call back directly if there are further questions, or if these symptoms fail to improve as anticipated or worsen. Patient advised - If you have any new or worsening symptoms or need immediate medical attention call 911, please go to the Emergency Room and/or Urgent Care.

## 2023-11-13 NOTE — TELEPHONE ENCOUNTER
"Writer called pt regarding my chart message. Pt denies any recent injury. Pt declines needing to go to the Emergency room when asked. Pt reports \"sharp pain is typically felt only with big breaths\".  Pt reports cough \"is better, pretty much gone\".  \"I think my ribcage pain is related to all the coughing I was doing\". Denies fever.   Pt denies any other sx or concerns. Pt denies taking anything for the pain. Pt reports pain is mild to moderate. Pt will try IBU.    PCP to advise on plan.     "

## 2023-11-14 NOTE — TELEPHONE ENCOUNTER
I would see him here, but we don't have xray.  He might have to be seen in [place] or go to Urgent Care, unfortunately.

## 2023-11-14 NOTE — TELEPHONE ENCOUNTER
11/14/2023 8:52 AM  Pt advised to urgent care. No openings in [place] today. Pt agrees to plan.   Kinjal Desai RN

## 2023-12-07 ENCOUNTER — TRANSFERRED RECORDS (OUTPATIENT)
Dept: HEALTH INFORMATION MANAGEMENT | Facility: HOSPITAL | Age: 38
End: 2023-12-07

## 2024-03-07 ENCOUNTER — APPOINTMENT (OUTPATIENT)
Dept: LAB | Facility: OTHER | Age: 39
End: 2024-03-07
Payer: COMMERCIAL

## 2024-03-07 ENCOUNTER — TELEPHONE (OUTPATIENT)
Dept: FAMILY MEDICINE | Facility: OTHER | Age: 39
End: 2024-03-07

## 2024-03-14 ENCOUNTER — TELEPHONE (OUTPATIENT)
Dept: FAMILY MEDICINE | Facility: OTHER | Age: 39
End: 2024-03-14

## 2024-03-14 NOTE — PROGRESS NOTES
"  Assessment & Plan     1. Diarrhea, unspecified type  Will refer to GI to discuss possibility of colonoscopy with biopsies.  Symptomatic cares recommended, fluids, bland diet, etc.  Follow-up as directed.  - Adult GI  Referral - Consult Only; Future    2. Elevated ferritin  Will recheck level, previously elevated.  - Ferritin; Future  - Ferritin       MED REC REQUIRED  Post Medication Reconciliation Status: patient was not discharged from an inpatient facility or TCU  BMI  Estimated body mass index is 28.35 kg/m  as calculated from the following:    Height as of 10/19/23: 1.867 m (6' 1.5\").    Weight as of this encounter: 98.8 kg (217 lb 12.8 oz).   Weight management plan: Discussed healthy diet and exercise guidelines      Return if symptoms worsen or fail to improve.      Loulou Manuel is a 38 year old, presenting for the following health issues:  Hospital F/U    HPI     ED/UC Followup:    Facility:  [facility]   Date of visit: 3/12/24  Reason for visit: Diarrhea   Current Status: Improved   Patient did have stool sample that found rotavirus.  ER physician mentioned the possibility of colonoscopy to look for colitis due to recurrence of symptoms.        Review of Systems  Constitutional, HEENT, cardiovascular, pulmonary, gi and gu systems are negative, except as otherwise noted.      Objective    /80 (BP Location: Left arm, Patient Position: Sitting, Cuff Size: Adult Large)   Pulse 68   Temp 97.4  F (36.3  C) (Tympanic)   Resp 16   Wt 98.8 kg (217 lb 12.8 oz)   SpO2 96%   BMI 28.35 kg/m    Body mass index is 28.35 kg/m .  Physical Exam   GENERAL: alert and no distress  PSYCH: mentation appears normal, affect normal/bright          Signed Electronically by: Tomasa Colin MD    "

## 2024-03-14 NOTE — TELEPHONE ENCOUNTER
11:27 AM    Reason for Call: OVERBOOK/ER FOLLOW UP     Patient is having the following symptoms: Kaleb was in the [facility] ER in [place] and wants to do a follow up.    The patient is requesting an appointment for ER follow up with Dr Colin.    Was an appointment offered for this call? No  If yes : Appointment type              Date    Preferred method for responding to this message: Telephone Call  What is your phone number ? [phone]    If we cannot reach you directly, may we leave a detailed response at the number you provided? Yes    Can this message wait until your PCP/provider returns, if unavailable today?

## 2024-03-18 ENCOUNTER — OFFICE VISIT (OUTPATIENT)
Dept: FAMILY MEDICINE | Facility: OTHER | Age: 39
End: 2024-03-18
Attending: FAMILY MEDICINE
Payer: COMMERCIAL

## 2024-03-18 VITALS
WEIGHT: 217.8 LBS | OXYGEN SATURATION: 96 % | BODY MASS INDEX: 28.35 KG/M2 | RESPIRATION RATE: 16 BRPM | HEART RATE: 68 BPM | DIASTOLIC BLOOD PRESSURE: 80 MMHG | SYSTOLIC BLOOD PRESSURE: 119 MMHG | TEMPERATURE: 97.4 F

## 2024-03-18 DIAGNOSIS — R79.89 ELEVATED FERRITIN: ICD-10-CM

## 2024-03-18 DIAGNOSIS — R19.7 DIARRHEA, UNSPECIFIED TYPE: Primary | ICD-10-CM

## 2024-03-18 LAB — FERRITIN SERPL-MCNC: 523 NG/ML (ref 31–409)

## 2024-03-18 PROCEDURE — 36415 COLL VENOUS BLD VENIPUNCTURE: CPT | Performed by: FAMILY MEDICINE

## 2024-03-18 PROCEDURE — 82728 ASSAY OF FERRITIN: CPT | Performed by: FAMILY MEDICINE

## 2024-03-18 PROCEDURE — 99213 OFFICE O/P EST LOW 20 MIN: CPT | Performed by: FAMILY MEDICINE

## 2024-03-18 ASSESSMENT — PAIN SCALES - GENERAL: PAINLEVEL: NO PAIN (0)

## 2024-04-09 ENCOUNTER — TRANSFERRED RECORDS (OUTPATIENT)
Dept: HEALTH INFORMATION MANAGEMENT | Facility: HOSPITAL | Age: 39
End: 2024-04-09

## 2024-04-09 ENCOUNTER — MEDICAL CORRESPONDENCE (OUTPATIENT)
Dept: HEALTH INFORMATION MANAGEMENT | Facility: HOSPITAL | Age: 39
End: 2024-04-09

## 2024-04-09 DIAGNOSIS — R79.89 ELEVATED FERRITIN: ICD-10-CM

## 2024-04-09 DIAGNOSIS — R19.7 DIARRHEA: ICD-10-CM

## 2024-04-09 DIAGNOSIS — K21.9 GASTROESOPHAGEAL REFLUX DISEASE WITHOUT ESOPHAGITIS: Primary | ICD-10-CM

## 2024-04-09 DIAGNOSIS — R79.89 HYPOURICEMIA: ICD-10-CM

## 2024-04-17 ENCOUNTER — LAB (OUTPATIENT)
Dept: LAB | Facility: OTHER | Age: 39
End: 2024-04-17
Payer: COMMERCIAL

## 2024-04-17 DIAGNOSIS — R19.7 DIARRHEA: ICD-10-CM

## 2024-04-17 DIAGNOSIS — K21.9 GASTROESOPHAGEAL REFLUX DISEASE WITHOUT ESOPHAGITIS: ICD-10-CM

## 2024-04-17 DIAGNOSIS — R79.89 ELEVATED FERRITIN: ICD-10-CM

## 2024-04-17 LAB
HOLD SPECIMEN: NORMAL
IRON BINDING CAPACITY (ROCHE): 271 UG/DL (ref 240–430)
IRON SATN MFR SERPL: 29 % (ref 15–46)
IRON SERPL-MCNC: 79 UG/DL (ref 61–157)
LAB DIRECTOR COMMENTS: NORMAL
LAB DIRECTOR DISCLAIMER: NORMAL
LAB DIRECTOR INTERPRETATION: NORMAL
LAB DIRECTOR METHODOLOGY: NORMAL
LAB DIRECTOR RESULTS: NORMAL
LOCATION OF TASK: NORMAL
SPECIMEN DESCRIPTION: NORMAL

## 2024-04-17 PROCEDURE — 83540 ASSAY OF IRON: CPT | Performed by: FAMILY MEDICINE

## 2024-04-17 PROCEDURE — G0452 MOLECULAR PATHOLOGY INTERPR: HCPCS | Mod: 26 | Performed by: PATHOLOGY

## 2024-04-17 PROCEDURE — 81256 HFE GENE: CPT | Performed by: FAMILY MEDICINE

## 2024-04-17 PROCEDURE — 83550 IRON BINDING TEST: CPT | Performed by: FAMILY MEDICINE

## 2024-04-17 PROCEDURE — 36415 COLL VENOUS BLD VENIPUNCTURE: CPT | Performed by: FAMILY MEDICINE

## 2024-04-18 ENCOUNTER — APPOINTMENT (OUTPATIENT)
Dept: LAB | Facility: OTHER | Age: 39
End: 2024-04-18
Payer: COMMERCIAL

## 2024-04-18 PROCEDURE — 83993 ASSAY FOR CALPROTECTIN FECAL: CPT

## 2024-04-19 LAB — CALPROTECTIN STL-MCNT: 23.5 MG/KG (ref 0–49.9)

## 2024-04-23 ENCOUNTER — TRANSFERRED RECORDS (OUTPATIENT)
Dept: HEALTH INFORMATION MANAGEMENT | Facility: CLINIC | Age: 39
End: 2024-04-23

## 2024-05-28 ENCOUNTER — OFFICE VISIT (OUTPATIENT)
Dept: CARE COORDINATION | Facility: OTHER | Age: 39
End: 2024-05-28
Attending: INTERNAL MEDICINE
Payer: COMMERCIAL

## 2024-05-28 VITALS
RESPIRATION RATE: 16 BRPM | OXYGEN SATURATION: 97 % | DIASTOLIC BLOOD PRESSURE: 78 MMHG | HEART RATE: 99 BPM | BODY MASS INDEX: 29.65 KG/M2 | HEIGHT: 74 IN | SYSTOLIC BLOOD PRESSURE: 128 MMHG | WEIGHT: 231 LBS

## 2024-05-28 DIAGNOSIS — K21.9 GASTROESOPHAGEAL REFLUX DISEASE WITHOUT ESOPHAGITIS: Primary | ICD-10-CM

## 2024-05-28 DIAGNOSIS — T73.2XXA FATIGUE DUE TO EXPOSURE, INITIAL ENCOUNTER: ICD-10-CM

## 2024-05-28 PROBLEM — R19.7 DIARRHEA: Chronic | Status: ACTIVE | Noted: 2024-05-28

## 2024-05-28 LAB
HGB BLD-MCNC: 15.3 G/DL (ref 13.3–17.7)
TSH SERPL DL<=0.005 MIU/L-ACNC: 2.26 UIU/ML (ref 0.3–4.2)

## 2024-05-28 PROCEDURE — 36415 COLL VENOUS BLD VENIPUNCTURE: CPT | Performed by: INTERNAL MEDICINE

## 2024-05-28 PROCEDURE — G0463 HOSPITAL OUTPT CLINIC VISIT: HCPCS

## 2024-05-28 PROCEDURE — 84443 ASSAY THYROID STIM HORMONE: CPT | Performed by: INTERNAL MEDICINE

## 2024-05-28 PROCEDURE — 85018 HEMOGLOBIN: CPT | Performed by: INTERNAL MEDICINE

## 2024-05-28 ASSESSMENT — PAIN SCALES - GENERAL: PAINLEVEL: NO PAIN (0)

## 2024-05-28 NOTE — PATIENT INSTRUCTIONS
Thank you for allowing Dr Jose L Stroud  to participate in your care. Please call our office at [phone] with scheduling questions or with any other questions or concerns.

## 2024-05-28 NOTE — PROGRESS NOTES
"SUBJECTIVE:            Kaleb Junior is a 39 year old male, here with his wife, in today for: Follow-up of his elevated ferritin diarrhea and reflux his iron profile was normal aside from the ferritin his HFE genotyping was also normal essentially ruling out hemochromatosis I explained to him that ferritin can be an acute phase reactant it sounds like he may have had a bout of gout at the time the ferritin level was drawn which would explain its elevation but at this point no further evaluation for possible hemochromatosis is necessary he certainly does not have that condition.  With respect to his reflux at the last appointment we discussed avoiding evening snacks and elevating the head of the bed as his symptoms were nocturnal and since making those changes he has had no more nocturnal reflux symptoms he has had no further difficulty with diarrhea either his colonoscopy with biopsies were normal    His wife then mention that he has had trouble with fatigue no other symptoms just generalized loss of energy so I suggested we would check a TSH level and hemoglobin      ROS:  CONSTITUTIONAL: NEGATIVE for fever, chills, change in weight  ENT/MOUTH: NEGATIVE for ear, mouth and throat problems  RESP: NEGATIVE for significant cough or SOB  CV: NEGATIVE for chest pain, palpitations or peripheral edema    Problem list, Medication list, Allergies, and Medical/Social/Surgical histories reviewed in [facility] and updated as appropriate.    OBJECTIVE:                          /78   Pulse 99   Resp 16   Ht 1.867 m (6' 1.5\")   Wt 104.8 kg (231 lb)   SpO2 97%   BMI 30.06 kg/m     GENERAL: healthy, alert, well nourished, well hydrated, no distress  HENT: ear canals- normal; TMs- normal; Nose- normal; Mouth- no ulcers, no lesions  NECK: no tenderness, no adenopathy, no asymmetry, no masses, no stiffness; thyroid- normal to palpation  RESP: lungs clear to auscultation - no rales, no rhonchi, no wheezes  CV: regular rates and " rhythm, normal S1 S2, no S3 or S4 and no murmur, no click or rub -  ABDOMEN: soft, no tenderness, no  hepatosplenomegaly, no masses, normal bowel sounds     Lab Results   Component Value Date    WBC 8.9 10/19/2023    HGB 15.7 10/19/2023    HCT 47.2 10/19/2023     10/19/2023    CHOL 200 (H) 11/05/2018    TRIG 108 11/05/2018    HDL 61 11/05/2018    ALT 26 07/27/2020    AST 12 07/27/2020     10/19/2023    BUN 13.7 10/19/2023    CO2 26 10/19/2023    TSH 1.73 10/19/2023       ASSESSMENT/PLAN:              ICD-10-CM    1. Gastroesophageal reflux disease without esophagitis  K21.9 Hemoglobin     TSH     TSH     Hemoglobin      2. Diarrhea  R19.7 Hemoglobin     TSH     TSH     Hemoglobin      3. Fatigue due to exposure, initial encounter  T73.2XXA         Check TSH and hemoglobin  Risks, benefits and alternatives of treatments discussed. Plan agreed on.      Followup: As needed    Will call, return to clinic, or go to ED if worsening or symptoms not improving as discussed.

## 2024-07-07 ENCOUNTER — HEALTH MAINTENANCE LETTER (OUTPATIENT)
Age: 39
End: 2024-07-07

## 2025-07-13 ENCOUNTER — HEALTH MAINTENANCE LETTER (OUTPATIENT)
Age: 40
End: 2025-07-13

## (undated) DEVICE — TUBING-SUCTION 20FT

## (undated) DEVICE — FORCEP-COLON BIOPSY LARGE W/NEEDLE 240CM

## (undated) DEVICE — LIGHT HANDLE COVER

## (undated) DEVICE — TRAY-SKIN PREP POVIDONE/IODINE

## (undated) DEVICE — CONNECTOR-ERBEFLO 2 PORT

## (undated) DEVICE — CAUTERY PAD-POLYHESIVE II ADULT

## (undated) DEVICE — PACK-LAPAROTOMY-CUSTOM

## (undated) DEVICE — DRSG-KERLIX 6 X 6 3/4 FLUFF

## (undated) DEVICE — GLV-7.5 BIOGEL LATEX

## (undated) DEVICE — VESSEL LOOP-RED MAXI

## (undated) DEVICE — IRRIGATION-H2O 1000ML

## (undated) DEVICE — SWABSTICK-BENZOIN

## (undated) DEVICE — DRSG-SPONGE STERILE 4 X 4

## (undated) DEVICE — LABEL-STERILE PREPRINTED FOR OR

## (undated) DEVICE — STERI-STRIP-1/2" X 4"

## (undated) DEVICE — SCD SLEEVE-KNEE REG.

## (undated) DEVICE — PACK-BASIN SET-UP

## (undated) DEVICE — BLADE-SCALPEL #15

## (undated) DEVICE — IRRIGATION-NACL 1000ML

## (undated) DEVICE — SUCTION TUBE-YANKAUR

## (undated) DEVICE — CANISTER-SUCTION 2000CC

## (undated) DEVICE — PANTIES-MESH L/XL

## (undated) DEVICE — SUTURE-VICRYL 3-0 SH J416H

## (undated) RX ORDER — LIDOCAINE HYDROCHLORIDE 20 MG/ML
INJECTION, SOLUTION EPIDURAL; INFILTRATION; INTRACAUDAL; PERINEURAL
Status: DISPENSED
Start: 2020-10-01

## (undated) RX ORDER — DEXAMETHASONE SODIUM PHOSPHATE 10 MG/ML
INJECTION, SOLUTION INTRAMUSCULAR; INTRAVENOUS
Status: DISPENSED
Start: 2020-07-27

## (undated) RX ORDER — ONDANSETRON 2 MG/ML
INJECTION INTRAMUSCULAR; INTRAVENOUS
Status: DISPENSED
Start: 2020-07-27

## (undated) RX ORDER — FENTANYL CITRATE 50 UG/ML
INJECTION, SOLUTION INTRAMUSCULAR; INTRAVENOUS
Status: DISPENSED
Start: 2020-07-27

## (undated) RX ORDER — KETOROLAC TROMETHAMINE 30 MG/ML
INJECTION, SOLUTION INTRAMUSCULAR; INTRAVENOUS
Status: DISPENSED
Start: 2020-07-27

## (undated) RX ORDER — PROPOFOL 10 MG/ML
INJECTION, EMULSION INTRAVENOUS
Status: DISPENSED
Start: 2020-07-27

## (undated) RX ORDER — PROPOFOL 10 MG/ML
INJECTION, EMULSION INTRAVENOUS
Status: DISPENSED
Start: 2020-10-01